# Patient Record
Sex: MALE | Race: WHITE
[De-identification: names, ages, dates, MRNs, and addresses within clinical notes are randomized per-mention and may not be internally consistent; named-entity substitution may affect disease eponyms.]

---

## 2017-01-03 ENCOUNTER — HOSPITAL ENCOUNTER (OUTPATIENT)
Dept: HOSPITAL 53 - M SFHCADAM | Age: 72
End: 2017-01-03
Attending: FAMILY MEDICINE
Payer: MEDICARE

## 2017-01-03 DIAGNOSIS — M25.50: Primary | ICD-10-CM

## 2017-01-17 ENCOUNTER — HOSPITAL ENCOUNTER (OUTPATIENT)
Dept: HOSPITAL 53 - M RAD | Age: 72
End: 2017-01-17
Attending: ORTHOPAEDIC SURGERY
Payer: MEDICARE
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
## 2017-01-17 DIAGNOSIS — S22.080A: Primary | ICD-10-CM
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
## 2017-01-17 PROCEDURE — 78306 BONE IMAGING WHOLE BODY: CPT

## 2017-01-17 NOTE — REP
Whole body radionuclide bone scan:

 

History:  Wedge compression fracture T11-12 vertebra.  Chronic back pain.  No

recent fall.

 

Comparison MRI study lumbar spine September 19, 2016 suggests osteoporotic

insufficiency fractures.  Thoracic spine MRI study August 25, 2016 shows old

wedging at T3 and T8.

 

Technique:  21.6 mCi technetium 99m MDP is injected and standard whole body bone

scan imaging is acquired.

 

Scintigraphic findings:  There is uptake in bilateral kidneys and in the urinary

bladder.  There is evidence of a left posterior bladder diverticulum.

 

There is a horizontal band-like area of increased uptake involving the T12

vertebral body consistent with a recent osteoporotic wedge compression deformity.

There is also a mild linear area of increased uptake in the L2 vertebral body.

Otherwise, there is normal distribution of skeletal tracer.  There is no evidence

to suggest metastatic disease.  Mild arthritic uptake is seen in each knee.

 

Impression:

 

Findings consistent with recent osteoporotic compression deformities at T12 and

L2.  No scintigraphic evidence to suggest metastatic disease.

 

 

Signed by

Gabe Morse MD 01/17/2017 02:32 P

## 2017-01-29 ENCOUNTER — HOSPITAL ENCOUNTER (OUTPATIENT)
Dept: HOSPITAL 53 - M LAB REF | Age: 72
End: 2017-01-29
Attending: INTERNAL MEDICINE
Payer: MEDICARE

## 2017-01-29 DIAGNOSIS — M80.00XA: Primary | ICD-10-CM

## 2017-02-06 ENCOUNTER — HOSPITAL ENCOUNTER (OUTPATIENT)
Dept: HOSPITAL 53 - M SFHCADAM | Age: 72
End: 2017-02-06
Attending: FAMILY MEDICINE
Payer: MEDICARE

## 2017-02-06 DIAGNOSIS — S22.000G: Primary | ICD-10-CM
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
## 2017-02-06 DIAGNOSIS — E83.52: ICD-10-CM
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
## 2017-02-06 LAB — PROT SERPL-MCNC: 7.2 GM/DL (ref 6.4–8.2)

## 2017-02-08 LAB
ALBUMIN MFR UR ELPH: 57.7 % (ref 55.8–66.1)
ALBUMIN SERPL-MCNC: 4.15 GM/DL (ref 3.29–5.55)
GAMMA GLOB 24H MFR UR ELPH: 12.8 % (ref 11.1–18.8)

## 2017-05-31 ENCOUNTER — HOSPITAL ENCOUNTER (OUTPATIENT)
Dept: HOSPITAL 53 - M LAB REF | Age: 72
End: 2017-05-31
Attending: INTERNAL MEDICINE
Payer: MEDICARE

## 2017-05-31 DIAGNOSIS — E83.59: Primary | ICD-10-CM

## 2017-06-02 ENCOUNTER — HOSPITAL ENCOUNTER (OUTPATIENT)
Dept: HOSPITAL 53 - M SFHCCLAY | Age: 72
End: 2017-06-02
Attending: FAMILY MEDICINE
Payer: MEDICARE

## 2017-06-02 DIAGNOSIS — E78.2: Primary | ICD-10-CM

## 2017-06-02 LAB
ALBUMIN SERPL BCG-MCNC: 3.7 GM/DL (ref 3.2–5.2)
ALBUMIN/GLOB SERPL: 1.19 {RATIO} (ref 1–1.93)
ALP SERPL-CCNC: 120 U/L (ref 45–117)
ALT SERPL W P-5'-P-CCNC: 34 U/L (ref 12–78)
ANION GAP SERPL CALC-SCNC: 7 MEQ/L (ref 8–16)
AST SERPL-CCNC: 21 U/L (ref 15–37)
BILIRUB SERPL-MCNC: 0.8 MG/DL (ref 0.2–1)
BUN SERPL-MCNC: 16 MG/DL (ref 7–18)
CALCIUM SERPL-MCNC: 9.2 MG/DL (ref 8.8–10.2)
CHLORIDE SERPL-SCNC: 104 MEQ/L (ref 98–107)
CHOLEST SERPL-MCNC: 136 MG/DL (ref ?–200)
CO2 SERPL-SCNC: 29 MEQ/L (ref 21–32)
CREAT SERPL-MCNC: 0.99 MG/DL (ref 0.7–1.3)
GFR SERPL CREATININE-BSD FRML MDRD: > 60 ML/MIN/{1.73_M2} (ref 42–?)
GLUCOSE SERPL-MCNC: 107 MG/DL (ref 83–110)
POTASSIUM SERPL-SCNC: 3.4 MEQ/L (ref 3.5–5.1)
PROT SERPL-MCNC: 6.8 GM/DL (ref 6.4–8.2)
SODIUM SERPL-SCNC: 140 MEQ/L (ref 136–145)
TRIGL SERPL-MCNC: 83 MG/DL (ref ?–150)

## 2017-10-10 ENCOUNTER — HOSPITAL ENCOUNTER (OUTPATIENT)
Dept: HOSPITAL 53 - M LAB REF | Age: 72
End: 2017-10-10
Attending: INTERNAL MEDICINE
Payer: MEDICARE

## 2017-10-10 DIAGNOSIS — M80.00XA: Primary | ICD-10-CM

## 2017-10-11 ENCOUNTER — HOSPITAL ENCOUNTER (OUTPATIENT)
Dept: HOSPITAL 53 - M LABDRAWC | Age: 72
End: 2017-10-11
Attending: INTERNAL MEDICINE
Payer: MEDICARE

## 2017-10-11 DIAGNOSIS — M80.00XA: Primary | ICD-10-CM

## 2017-10-11 DIAGNOSIS — M89.78: ICD-10-CM

## 2017-10-11 LAB
ANION GAP SERPL CALC-SCNC: 5 MEQ/L (ref 8–16)
BUN SERPL-MCNC: 14 MG/DL (ref 7–18)
CALCIUM SERPL-MCNC: 9.7 MG/DL (ref 8.8–10.2)
CHLORIDE SERPL-SCNC: 103 MEQ/L (ref 98–107)
CO2 SERPL-SCNC: 33 MEQ/L (ref 21–32)
CREAT SERPL-MCNC: 1.04 MG/DL (ref 0.7–1.3)
GFR SERPL CREATININE-BSD FRML MDRD: > 60 ML/MIN/{1.73_M2} (ref 42–?)
GLUCOSE SERPL-MCNC: 102 MG/DL (ref 83–110)
POTASSIUM SERPL-SCNC: 3 MEQ/L (ref 3.5–5.1)
SODIUM SERPL-SCNC: 141 MEQ/L (ref 136–145)

## 2017-11-01 ENCOUNTER — HOSPITAL ENCOUNTER (OUTPATIENT)
Dept: HOSPITAL 53 - M SFHCLERA | Age: 72
End: 2017-11-01
Attending: DERMATOLOGY
Payer: MEDICARE

## 2017-11-01 DIAGNOSIS — D48.5: Primary | ICD-10-CM

## 2017-11-01 PROCEDURE — 88305 TISSUE EXAM BY PATHOLOGIST: CPT

## 2017-11-01 PROCEDURE — 11100: CPT

## 2017-11-14 ENCOUNTER — HOSPITAL ENCOUNTER (OUTPATIENT)
Dept: HOSPITAL 53 - M LABDRAWC | Age: 72
End: 2017-11-14
Attending: INTERNAL MEDICINE
Payer: MEDICARE

## 2017-11-14 DIAGNOSIS — E87.6: Primary | ICD-10-CM

## 2017-11-17 ENCOUNTER — HOSPITAL ENCOUNTER (OUTPATIENT)
Dept: HOSPITAL 53 - M OPP | Age: 72
Discharge: HOME | End: 2017-11-17
Attending: INTERNAL MEDICINE
Payer: MEDICARE

## 2017-11-17 VITALS — DIASTOLIC BLOOD PRESSURE: 57 MMHG | SYSTOLIC BLOOD PRESSURE: 113 MMHG

## 2017-11-17 VITALS — WEIGHT: 189 LBS | HEIGHT: 70 IN | BODY MASS INDEX: 27.06 KG/M2

## 2017-11-17 DIAGNOSIS — Z95.5: ICD-10-CM

## 2017-11-17 DIAGNOSIS — M80.00XA: ICD-10-CM

## 2017-11-17 DIAGNOSIS — K57.30: ICD-10-CM

## 2017-11-17 DIAGNOSIS — Z79.82: ICD-10-CM

## 2017-11-17 DIAGNOSIS — Z88.0: ICD-10-CM

## 2017-11-17 DIAGNOSIS — Z80.1: ICD-10-CM

## 2017-11-17 DIAGNOSIS — Z87.891: ICD-10-CM

## 2017-11-17 DIAGNOSIS — M19.90: ICD-10-CM

## 2017-11-17 DIAGNOSIS — K64.8: ICD-10-CM

## 2017-11-17 DIAGNOSIS — Z79.899: ICD-10-CM

## 2017-11-17 DIAGNOSIS — Z12.11: Primary | ICD-10-CM

## 2017-11-17 DIAGNOSIS — Z88.8: ICD-10-CM

## 2017-11-17 DIAGNOSIS — D12.5: ICD-10-CM

## 2017-11-17 DIAGNOSIS — D12.3: ICD-10-CM

## 2017-11-17 DIAGNOSIS — I25.2: ICD-10-CM

## 2017-11-17 DIAGNOSIS — Z86.010: ICD-10-CM

## 2017-11-17 DIAGNOSIS — K62.1: ICD-10-CM

## 2017-11-17 DIAGNOSIS — I25.10: ICD-10-CM

## 2017-11-17 DIAGNOSIS — M54.5: ICD-10-CM

## 2017-11-17 DIAGNOSIS — Z87.442: ICD-10-CM

## 2017-11-17 RX ADMIN — SODIUM CHLORIDE ONE MLS/HR: 9 INJECTION, SOLUTION INTRAVENOUS at 08:42

## 2017-11-17 NOTE — ROOR
________________________________________________________________________________

Patient Name: Sebastian Torres            Procedure Date: 11/17/2017 9:38 AM

MRN: [id number]                          Account Number: [id number]

YOB: 1945                Age: 72

Room: [facility]                            Gender: Male

Note Status: Finalized                 

________________________________________________________________________________

 

Procedure:           Colonoscopy

Indications:         High risk colon cancer surveillance: Personal history of 

                     colonic polyps, Surveillance: History of adenomatous 

                     polyps, inadequate prep on last exam (<3yr)

Providers:           Michael BOYD MD

Referring MD:        Yunier Mims MD

Requesting Provider: 

Medicines:           Monitored Anesthesia Care

Complications:       No immediate complications.

________________________________________________________________________________

Procedure:           Pre-Anesthesia Assessment:

                     - The heart rate, respiratory rate, oxygen saturations, 

                     blood pressure, adequacy of pulmonary ventilation, and 

                     response to care were monitored throughout the procedure.

                     The Colonoscope was introduced through the anus and 

                     advanced to the cecum, identified by appendiceal orifice 

                     and ileocecal valve. The colonoscopy was performed 

                     without difficulty. The patient tolerated the procedure 

                     well. The quality of the bowel preparation was good.

                                                                                

Findings:

     The perianal and digital rectal examinations were normal.

     Three sessile polyps were found in the rectum, sigmoid colon and splenic 

     flexure. The polyps were 3 to 5 mm in size. These polyps were removed 

     with a cold snare. Resection and retrieval were complete.

     Multiple diverticula were found in the sigmoid colon.

     Internal hemorrhoids were found during retroflexion. The hemorrhoids were 

     medium-sized. prominent hemorrhoidal vessels

     The exam was otherwise without abnormality on direct and retroflexion 

     views.

                                                                                

Impression:          - Three 3 to 5 mm polyps in the rectum, in the sigmoid 

                     colon and at the splenic flexure, removed with a cold 

                     snare. Resected and retrieved.

                     - Moderate diverticulosis in the sigmoid colon.

                     - Internal hemorrhoids.

                     - The examination was otherwise normal on direct and 

                     retroflexion views.

Recommendation:      - Repeat colonoscopy in 3 years for surveillance.

                     - Telephone endoscopist for pathology results in 2 weeks.

                                                                                

 

Michael Boyd MD

________________

Michael BOYD MD

11/17/2017 10:03:51 AM

This report has been signed electronically.

Number of Addenda: 0

 

Note Initiated On: 11/17/2017 9:38 AM

Estimated Blood Loss:

     Estimated blood loss: none.

## 2017-12-06 ENCOUNTER — HOSPITAL ENCOUNTER (OUTPATIENT)
Dept: HOSPITAL 53 - M SFHCLERA | Age: 72
End: 2017-12-06
Attending: DERMATOLOGY
Payer: MEDICARE

## 2017-12-06 DIAGNOSIS — L98.9: Primary | ICD-10-CM

## 2017-12-27 ENCOUNTER — HOSPITAL ENCOUNTER (OUTPATIENT)
Dept: HOSPITAL 53 - M SFHCCLAY | Age: 72
End: 2017-12-27
Attending: FAMILY MEDICINE
Payer: MEDICARE

## 2017-12-27 DIAGNOSIS — Z12.5: ICD-10-CM

## 2017-12-27 DIAGNOSIS — R73.03: ICD-10-CM

## 2017-12-27 DIAGNOSIS — I25.10: Primary | ICD-10-CM

## 2017-12-27 DIAGNOSIS — E78.2: ICD-10-CM

## 2017-12-27 LAB
ALBUMIN SERPL BCG-MCNC: 3.9 GM/DL (ref 3.2–5.2)
ALBUMIN/GLOB SERPL: 1.34 {RATIO} (ref 1–1.93)
ALP SERPL-CCNC: 149 U/L (ref 45–117)
ALT SERPL W P-5'-P-CCNC: 37 U/L (ref 12–78)
ANION GAP SERPL CALC-SCNC: 7 MEQ/L (ref 8–16)
AST SERPL-CCNC: 27 U/L (ref 7–37)
BILIRUB SERPL-MCNC: 0.9 MG/DL (ref 0.2–1)
BUN SERPL-MCNC: 17 MG/DL (ref 7–18)
CALCIUM SERPL-MCNC: 9 MG/DL (ref 8.8–10.2)
CHLORIDE SERPL-SCNC: 102 MEQ/L (ref 98–107)
CHOLEST SERPL-MCNC: 117 MG/DL (ref ?–200)
CO2 SERPL-SCNC: 32 MEQ/L (ref 21–32)
CREAT SERPL-MCNC: 1.01 MG/DL (ref 0.7–1.3)
ERYTHROCYTE [DISTWIDTH] IN BLOOD BY AUTOMATED COUNT: 13.5 % (ref 11.5–14.5)
EST. AVERAGE GLUCOSE BLD GHB EST-MCNC: 117 MG/DL (ref 60–110)
GFR SERPL CREATININE-BSD FRML MDRD: > 60 ML/MIN/{1.73_M2} (ref 42–?)
GLUCOSE SERPL-MCNC: 112 MG/DL (ref 83–110)
MCH RBC QN AUTO: 33.2 PG (ref 27–33)
MCHC RBC AUTO-ENTMCNC: 35.6 G/DL (ref 32–36.5)
MCV RBC AUTO: 93.2 FL (ref 80–96)
NRBC BLD AUTO-RTO: 0 % (ref 0–0)
PLATELET # BLD AUTO: 186 10^3/UL (ref 150–450)
POTASSIUM SERPL-SCNC: 2.8 MEQ/L (ref 3.5–5.1)
PROT SERPL-MCNC: 6.8 GM/DL (ref 6.4–8.2)
SODIUM SERPL-SCNC: 141 MEQ/L (ref 136–145)
TRIGL SERPL-MCNC: 67 MG/DL (ref ?–150)
WBC # BLD AUTO: 5 10^3/UL (ref 4–10)

## 2017-12-27 PROCEDURE — 80053 COMPREHEN METABOLIC PANEL: CPT

## 2017-12-27 PROCEDURE — 83036 HEMOGLOBIN GLYCOSYLATED A1C: CPT

## 2017-12-27 PROCEDURE — 85027 COMPLETE CBC AUTOMATED: CPT

## 2017-12-27 PROCEDURE — 80061 LIPID PANEL: CPT

## 2017-12-29 ENCOUNTER — HOSPITAL ENCOUNTER (OUTPATIENT)
Dept: HOSPITAL 53 - M SFHCCLAY | Age: 72
End: 2017-12-29
Attending: FAMILY MEDICINE
Payer: MEDICARE

## 2017-12-29 DIAGNOSIS — E87.6: Primary | ICD-10-CM

## 2017-12-29 LAB
ANION GAP: 5 MEQ/L (ref 8–16)
BLOOD UREA NITROGEN: 13 MG/DL (ref 7–18)
CALCIUM LEVEL: 9.7 MG/DL (ref 8.8–10.2)
CARBON DIOXIDE LEVEL: 33 MEQ/L (ref 21–32)
CHLORIDE LEVEL: 104 MEQ/L (ref 98–107)
CREATININE FOR GFR: 1.03 MG/DL (ref 0.7–1.3)
GFR SERPL CREATININE-BSD FRML MDRD: > 60 ML/MIN/{1.73_M2} (ref 42–?)
GLUCOSE, FASTING: 112 MG/DL (ref 83–110)
POTASSIUM SERUM: 3.1 MEQ/L (ref 3.5–5.1)
SODIUM LEVEL: 142 MEQ/L (ref 136–145)

## 2017-12-29 PROCEDURE — 80048 BASIC METABOLIC PNL TOTAL CA: CPT

## 2018-02-27 ENCOUNTER — HOSPITAL ENCOUNTER (OUTPATIENT)
Dept: HOSPITAL 53 - M SFHCCLAY | Age: 73
End: 2018-02-27
Attending: FAMILY MEDICINE
Payer: MEDICARE

## 2018-02-27 DIAGNOSIS — E87.6: Primary | ICD-10-CM

## 2018-02-27 LAB
ANION GAP: 8 MEQ/L (ref 8–16)
BLOOD UREA NITROGEN: 17 MG/DL (ref 7–18)
CALCIUM LEVEL: 9.1 MG/DL (ref 8.8–10.2)
CARBON DIOXIDE LEVEL: 30 MEQ/L (ref 21–32)
CHLORIDE LEVEL: 105 MEQ/L (ref 98–107)
CREATININE FOR GFR: 0.91 MG/DL (ref 0.7–1.3)
GFR SERPL CREATININE-BSD FRML MDRD: > 60 ML/MIN/{1.73_M2} (ref 42–?)
GLUCOSE, FASTING: 104 MG/DL (ref 70–100)
MAGNESIUM LEVEL: 2 MG/DL (ref 1.8–2.4)
POTASSIUM SERUM: 3 MEQ/L (ref 3.5–5.1)
SODIUM LEVEL: 143 MEQ/L (ref 136–145)

## 2018-02-27 PROCEDURE — 83735 ASSAY OF MAGNESIUM: CPT

## 2018-04-19 ENCOUNTER — HOSPITAL ENCOUNTER (OUTPATIENT)
Dept: HOSPITAL 53 - M LABDRAWC | Age: 73
End: 2018-04-19
Attending: INTERNAL MEDICINE
Payer: MEDICARE

## 2018-04-19 DIAGNOSIS — M80.00XA: Primary | ICD-10-CM

## 2018-04-19 LAB
ANION GAP: 9 MEQ/L (ref 8–16)
BLOOD UREA NITROGEN: 15 MG/DL (ref 7–18)
CALCIUM LEVEL: 10.7 MG/DL (ref 8.8–10.2)
CARBON DIOXIDE LEVEL: 29 MEQ/L (ref 21–32)
CHLORIDE LEVEL: 103 MEQ/L (ref 98–107)
CREATININE FOR GFR: 1.03 MG/DL (ref 0.7–1.3)
GFR SERPL CREATININE-BSD FRML MDRD: > 60 ML/MIN/{1.73_M2} (ref 42–?)
GLUCOSE, FASTING: 125 MG/DL (ref 70–100)
POTASSIUM SERUM: 3.5 MEQ/L (ref 3.5–5.1)
SODIUM LEVEL: 141 MEQ/L (ref 136–145)

## 2018-04-19 PROCEDURE — 80048 BASIC METABOLIC PNL TOTAL CA: CPT

## 2018-06-06 ENCOUNTER — HOSPITAL ENCOUNTER (OUTPATIENT)
Dept: HOSPITAL 53 - M SFHCLERA | Age: 73
End: 2018-06-06
Attending: DERMATOLOGY
Payer: MEDICARE

## 2018-06-06 DIAGNOSIS — L81.4: Primary | ICD-10-CM

## 2018-06-06 PROCEDURE — 88305 TISSUE EXAM BY PATHOLOGIST: CPT

## 2018-07-11 ENCOUNTER — HOSPITAL ENCOUNTER (OUTPATIENT)
Dept: HOSPITAL 53 - M SFHCCLAY | Age: 73
End: 2018-07-11
Attending: FAMILY MEDICINE
Payer: MEDICARE

## 2018-07-11 DIAGNOSIS — E87.6: ICD-10-CM

## 2018-07-11 DIAGNOSIS — R73.03: ICD-10-CM

## 2018-07-11 DIAGNOSIS — I25.10: Primary | ICD-10-CM

## 2018-07-11 LAB
ALBUMIN/GLOBULIN RATIO: 1.16 (ref 1–1.93)
ALBUMIN: 3.7 GM/DL (ref 3.2–5.2)
ALKALINE PHOSPHATASE: 115 U/L (ref 45–117)
ALT SERPL W P-5'-P-CCNC: 34 U/L (ref 12–78)
ANION GAP: 7 MEQ/L (ref 8–16)
AST SERPL-CCNC: 22 U/L (ref 7–37)
BILIRUBIN,TOTAL: 0.8 MG/DL (ref 0.2–1)
BLOOD UREA NITROGEN: 15 MG/DL (ref 7–18)
CALCIUM LEVEL: 9.4 MG/DL (ref 8.8–10.2)
CARBON DIOXIDE LEVEL: 31 MEQ/L (ref 21–32)
CHLORIDE LEVEL: 104 MEQ/L (ref 98–107)
CREATININE FOR GFR: 1.03 MG/DL (ref 0.7–1.3)
EST. AVERAGE GLUCOSE BLD GHB EST-MCNC: 114 MG/DL (ref 60–110)
GFR SERPL CREATININE-BSD FRML MDRD: > 60 ML/MIN/{1.73_M2} (ref 42–?)
GLUCOSE, FASTING: 105 MG/DL (ref 70–100)
HEMATOCRIT: 44.9 % (ref 42–52)
HEMOGLOBIN: 16 G/DL (ref 13.5–17.5)
MAGNESIUM LEVEL: 1.6 MG/DL (ref 1.8–2.4)
MEAN CORPUSCULAR HEMOGLOBIN: 34 PG (ref 27–33)
MEAN CORPUSCULAR HGB CONC: 35.6 G/DL (ref 32–36.5)
MEAN CORPUSCULAR VOLUME: 95.5 FL (ref 80–96)
NRBC BLD AUTO-RTO: 0 % (ref 0–0)
PLATELET COUNT, AUTOMATED: 157 10^3/UL (ref 150–450)
POTASSIUM SERUM: 3.2 MEQ/L (ref 3.5–5.1)
RED BLOOD COUNT: 4.7 10^6/UL (ref 4.3–6.1)
RED CELL DISTRIBUTION WIDTH: 13.1 % (ref 11.5–14.5)
SODIUM LEVEL: 142 MEQ/L (ref 136–145)
TOTAL PROTEIN: 6.9 GM/DL (ref 6.4–8.2)
WHITE BLOOD COUNT: 4.5 10^3/UL (ref 4–10)

## 2018-07-11 PROCEDURE — 83735 ASSAY OF MAGNESIUM: CPT

## 2018-07-19 ENCOUNTER — HOSPITAL ENCOUNTER (OUTPATIENT)
Dept: HOSPITAL 53 - M SFHCADAM | Age: 73
End: 2018-07-19
Attending: FAMILY MEDICINE
Payer: MEDICARE

## 2018-07-19 DIAGNOSIS — R76.8: ICD-10-CM

## 2018-07-19 DIAGNOSIS — M25.40: Primary | ICD-10-CM

## 2018-07-19 LAB
ERYTHROCYTE SEDIMENTATION RATE: 4 MM/HR (ref 0–20)
RHEUMATOID FACTOR QUANT: < 10 IU/ML (ref ?–15)

## 2018-07-19 PROCEDURE — 86609 BACTERIUM ANTIBODY: CPT

## 2018-07-26 LAB
ANA CENTROMERE PATTERN: (no result)
ANA HOMOGENEOUS PATTERN: (no result)
ANA INTERCELL BRIDGE TITR SER IF: (no result) {TITER}
ANA NUCLEAR DOTS TITR SER IF: (no result) {TITER}
ANA NUCLEAR MEMBRANE PATTERN: (no result)
ANA NUCLEOLAR PATTERN: (no result)
ANA SPECKLED PATTERN: (no result)
B BURGDOR IGG+IGM SER-ACNC: <0.91 ISR (ref 0–0.9)
B BURGDOR IGM SER IA-ACNC: <0.8 INDEX (ref 0–0.79)
B MICROTI IGG TITR SER: (no result) {TITER}
DEPRECATED CENTRIOLE AB TITR SER IF: (no result) {TITER}
ENA PCNA AB TITR SER IF: (no result) {TITER}
Lab: (no result)
MITOTIC SPINDLE APP AB TITR SERPL IF: (no result) {TITER}

## 2018-08-20 ENCOUNTER — HOSPITAL ENCOUNTER (OUTPATIENT)
Dept: HOSPITAL 53 - M LRY | Age: 73
End: 2018-08-20
Attending: INTERNAL MEDICINE
Payer: MEDICARE

## 2018-08-20 DIAGNOSIS — M17.0: ICD-10-CM

## 2018-08-20 DIAGNOSIS — S32.020D: ICD-10-CM

## 2018-08-20 DIAGNOSIS — S22.080D: Primary | ICD-10-CM

## 2018-08-20 DIAGNOSIS — M25.561: ICD-10-CM

## 2018-08-20 DIAGNOSIS — S32.040D: ICD-10-CM

## 2018-08-20 DIAGNOSIS — M54.5: ICD-10-CM

## 2018-08-20 PROCEDURE — 72100 X-RAY EXAM L-S SPINE 2/3 VWS: CPT

## 2018-08-29 ENCOUNTER — HOSPITAL ENCOUNTER (OUTPATIENT)
Dept: HOSPITAL 53 - M PT | Age: 73
LOS: 2 days | End: 2018-08-31
Attending: INTERNAL MEDICINE
Payer: MEDICARE

## 2018-08-29 DIAGNOSIS — Z51.89: Primary | ICD-10-CM

## 2018-08-29 DIAGNOSIS — M54.5: ICD-10-CM

## 2018-08-29 PROCEDURE — 97110 THERAPEUTIC EXERCISES: CPT

## 2018-09-05 ENCOUNTER — HOSPITAL ENCOUNTER (OUTPATIENT)
Dept: HOSPITAL 53 - M PT | Age: 73
LOS: 25 days | End: 2018-09-30
Attending: INTERNAL MEDICINE
Payer: MEDICARE

## 2018-09-05 DIAGNOSIS — M54.5: Primary | ICD-10-CM

## 2018-09-05 DIAGNOSIS — M25.561: ICD-10-CM

## 2018-09-05 PROCEDURE — 97110 THERAPEUTIC EXERCISES: CPT

## 2018-09-06 ENCOUNTER — HOSPITAL ENCOUNTER (OUTPATIENT)
Dept: HOSPITAL 53 - M RAD | Age: 73
End: 2018-09-06
Attending: FAMILY MEDICINE
Payer: MEDICARE

## 2018-09-06 DIAGNOSIS — I71.4: Primary | ICD-10-CM

## 2018-09-06 PROCEDURE — 76775 US EXAM ABDO BACK WALL LIM: CPT

## 2018-09-14 ENCOUNTER — HOSPITAL ENCOUNTER (OUTPATIENT)
Dept: HOSPITAL 53 - M SFHCCLAY | Age: 73
End: 2018-09-14
Attending: FAMILY MEDICINE
Payer: MEDICARE

## 2018-09-14 DIAGNOSIS — M54.5: Primary | ICD-10-CM

## 2018-09-14 DIAGNOSIS — E87.6: ICD-10-CM

## 2018-09-14 LAB
25(OH)D3 SERPL-MCNC: 37.9 NG/ML (ref 30–100)
ANION GAP: 8 MEQ/L (ref 8–16)
BASO #: 0 10^3/UL (ref 0–0.2)
BASO %: 0.9 % (ref 0–1)
BLOOD UREA NITROGEN: 14 MG/DL (ref 7–18)
CALCIUM LEVEL: 9.7 MG/DL (ref 8.8–10.2)
CARBON DIOXIDE LEVEL: 27 MEQ/L (ref 21–32)
CHLORIDE LEVEL: 106 MEQ/L (ref 98–107)
CREATININE FOR GFR: 1.07 MG/DL (ref 0.7–1.3)
EOS #: 0.2 10^3/UL (ref 0–0.5)
EOSINOPHIL NFR BLD AUTO: 4.6 % (ref 0–3)
GFR SERPL CREATININE-BSD FRML MDRD: > 60 ML/MIN/{1.73_M2} (ref 42–?)
GLUCOSE, FASTING: 104 MG/DL (ref 70–100)
HEMATOCRIT: 46.7 % (ref 42–52)
HEMOGLOBIN: 16 G/DL (ref 13.5–17.5)
IMMATURE GRANULOCYTE %: 0.2 % (ref 0–3)
LYMPH #: 1.3 10^3/UL (ref 1.5–4.5)
LYMPH %: 28.4 % (ref 24–44)
MAGNESIUM LEVEL: 2 MG/DL (ref 1.8–2.4)
MEAN CORPUSCULAR HEMOGLOBIN: 34.2 PG (ref 27–33)
MEAN CORPUSCULAR HGB CONC: 34.3 G/DL (ref 32–36.5)
MEAN CORPUSCULAR VOLUME: 99.8 FL (ref 80–96)
MONO #: 0.6 10^3/UL (ref 0–0.8)
MONO %: 13 % (ref 0–5)
NEUTROPHILS #: 2.4 10^3/UL (ref 1.8–7.7)
NEUTROPHILS %: 52.9 % (ref 36–66)
NRBC BLD AUTO-RTO: 0 % (ref 0–0)
PLATELET COUNT, AUTOMATED: 149 10^3/UL (ref 150–450)
POTASSIUM SERUM: 3.8 MEQ/L (ref 3.5–5.1)
RED BLOOD COUNT: 4.68 10^6/UL (ref 4.3–6.1)
RED CELL DISTRIBUTION WIDTH: 13.2 % (ref 11.5–14.5)
SODIUM LEVEL: 141 MEQ/L (ref 136–145)
THYROID STIMULATING HORMONE: 2.35 UIU/ML (ref 0.36–3.74)
VITAMIN B12 LEVEL: 501 PG/ML (ref 247–911)
WHITE BLOOD COUNT: 4.5 10^3/UL (ref 4–10)

## 2018-09-14 PROCEDURE — 83735 ASSAY OF MAGNESIUM: CPT

## 2018-10-02 ENCOUNTER — HOSPITAL ENCOUNTER (OUTPATIENT)
Dept: HOSPITAL 53 - M LABDRAW1 | Age: 73
End: 2018-10-02
Attending: INTERNAL MEDICINE
Payer: MEDICARE

## 2018-10-02 ENCOUNTER — HOSPITAL ENCOUNTER (OUTPATIENT)
Dept: HOSPITAL 53 - M PT | Age: 73
LOS: 29 days | End: 2018-10-31
Attending: INTERNAL MEDICINE
Payer: MEDICARE

## 2018-10-02 DIAGNOSIS — M54.5: ICD-10-CM

## 2018-10-02 DIAGNOSIS — M25.561: ICD-10-CM

## 2018-10-02 DIAGNOSIS — M80.00XA: Primary | ICD-10-CM

## 2018-10-02 DIAGNOSIS — Z51.89: Primary | ICD-10-CM

## 2018-10-02 LAB
ANION GAP: 7 MEQ/L (ref 8–16)
BLOOD UREA NITROGEN: 14 MG/DL (ref 7–18)
CALCIUM LEVEL: 9.3 MG/DL (ref 8.8–10.2)
CARBON DIOXIDE LEVEL: 26 MEQ/L (ref 21–32)
CHLORIDE LEVEL: 107 MEQ/L (ref 98–107)
CREATININE FOR GFR: 1.14 MG/DL (ref 0.7–1.3)
GFR SERPL CREATININE-BSD FRML MDRD: > 60 ML/MIN/{1.73_M2} (ref 42–?)
GLUCOSE, FASTING: 86 MG/DL (ref 70–100)
POTASSIUM SERUM: 4 MEQ/L (ref 3.5–5.1)
SODIUM LEVEL: 140 MEQ/L (ref 136–145)

## 2018-10-02 PROCEDURE — 97110 THERAPEUTIC EXERCISES: CPT

## 2018-10-02 PROCEDURE — 80048 BASIC METABOLIC PNL TOTAL CA: CPT

## 2018-10-04 ENCOUNTER — HOSPITAL ENCOUNTER (OUTPATIENT)
Dept: HOSPITAL 53 - M LAB REF | Age: 73
End: 2018-10-04
Attending: INTERNAL MEDICINE
Payer: MEDICARE

## 2018-10-04 DIAGNOSIS — M80.00XA: Primary | ICD-10-CM

## 2018-10-04 LAB — CALCIUM, 24 HOUR URINE: 330 MG/24HR (ref 42–353)

## 2018-10-04 PROCEDURE — 82340 ASSAY OF CALCIUM IN URINE: CPT

## 2018-10-05 ENCOUNTER — HOSPITAL ENCOUNTER (OUTPATIENT)
Dept: HOSPITAL 53 - M PLARAD | Age: 73
End: 2018-10-05
Attending: INTERNAL MEDICINE
Payer: MEDICARE

## 2018-10-05 DIAGNOSIS — M54.5: Primary | ICD-10-CM

## 2018-12-11 ENCOUNTER — HOSPITAL ENCOUNTER (OUTPATIENT)
Dept: HOSPITAL 53 - M SFHCADAM | Age: 73
End: 2018-12-11
Attending: FAMILY MEDICINE
Payer: MEDICARE

## 2018-12-11 DIAGNOSIS — E78.2: Primary | ICD-10-CM

## 2018-12-11 DIAGNOSIS — R73.03: ICD-10-CM

## 2018-12-11 DIAGNOSIS — I25.10: ICD-10-CM

## 2018-12-11 LAB
ALBUMIN SERPL BCG-MCNC: 4.2 GM/DL (ref 3.2–5.2)
ALT SERPL W P-5'-P-CCNC: 43 U/L (ref 12–78)
BILIRUB SERPL-MCNC: 0.9 MG/DL (ref 0.2–1)
BUN SERPL-MCNC: 16 MG/DL (ref 7–18)
CALCIUM SERPL-MCNC: 9.6 MG/DL (ref 8.8–10.2)
CHLORIDE SERPL-SCNC: 107 MEQ/L (ref 98–107)
CHOLEST SERPL-MCNC: 128 MG/DL (ref ?–200)
CHOLEST/HDLC SERPL: 2.37 {RATIO} (ref ?–5)
CO2 SERPL-SCNC: 31 MEQ/L (ref 21–32)
CREAT SERPL-MCNC: 1.12 MG/DL (ref 0.7–1.3)
EST. AVERAGE GLUCOSE BLD GHB EST-MCNC: 123 MG/DL (ref 60–110)
GFR SERPL CREATININE-BSD FRML MDRD: > 60 ML/MIN/{1.73_M2} (ref 42–?)
GLUCOSE SERPL-MCNC: 98 MG/DL (ref 70–100)
HCT VFR BLD AUTO: 48 % (ref 42–52)
HDLC SERPL-MCNC: 54 MG/DL (ref 40–?)
HGB BLD-MCNC: 16.4 G/DL (ref 13.5–17.5)
LDLC SERPL CALC-MCNC: 57 MG/DL (ref ?–100)
MAGNESIUM SERPL-MCNC: 2.2 MG/DL (ref 1.8–2.4)
MCH RBC QN AUTO: 33.7 PG (ref 27–33)
MCHC RBC AUTO-ENTMCNC: 34.2 G/DL (ref 32–36.5)
MCV RBC AUTO: 98.6 FL (ref 80–96)
NONHDLC SERPL-MCNC: 74 MG/DL
PLATELET # BLD AUTO: 159 10^3/UL (ref 150–450)
POTASSIUM SERPL-SCNC: 3.7 MEQ/L (ref 3.5–5.1)
PROT SERPL-MCNC: 7.4 GM/DL (ref 6.4–8.2)
RBC # BLD AUTO: 4.87 10^6/UL (ref 4.3–6.1)
SODIUM SERPL-SCNC: 143 MEQ/L (ref 136–145)
T4 FREE SERPL-MCNC: 1.05 NG/DL (ref 0.76–1.46)
TRIGL SERPL-MCNC: 86 MG/DL (ref ?–150)
TSH SERPL DL<=0.005 MIU/L-ACNC: 2.33 UIU/ML (ref 0.36–3.74)
WBC # BLD AUTO: 5.5 10^3/UL (ref 4–10)

## 2018-12-11 PROCEDURE — 84443 ASSAY THYROID STIM HORMONE: CPT

## 2018-12-11 PROCEDURE — 83036 HEMOGLOBIN GLYCOSYLATED A1C: CPT

## 2018-12-11 PROCEDURE — 80061 LIPID PANEL: CPT

## 2018-12-11 PROCEDURE — 80053 COMPREHEN METABOLIC PANEL: CPT

## 2018-12-11 PROCEDURE — 83735 ASSAY OF MAGNESIUM: CPT

## 2018-12-11 PROCEDURE — 84439 ASSAY OF FREE THYROXINE: CPT

## 2018-12-11 PROCEDURE — 85027 COMPLETE CBC AUTOMATED: CPT

## 2019-04-04 ENCOUNTER — HOSPITAL ENCOUNTER (OUTPATIENT)
Dept: HOSPITAL 53 - M WHC | Age: 74
End: 2019-04-04
Attending: NURSE PRACTITIONER
Payer: MEDICARE

## 2019-04-04 DIAGNOSIS — M85.88: ICD-10-CM

## 2019-04-04 DIAGNOSIS — M85.852: ICD-10-CM

## 2019-04-04 DIAGNOSIS — M85.851: ICD-10-CM

## 2019-04-04 DIAGNOSIS — M80.00XA: Primary | ICD-10-CM

## 2019-04-08 NOTE — DEXA
AP SPINE   L1 - L4   0.990   -1.7      -1.4

LT FEMUR   TOTAL   0.859   -1.2      -0.9

LT NECK      0.767   -2.0      -1.0

RT FEMUR   TOTAL   0.849   -1.3      -0.9      

RT NECK      0.785   -1.8      -0.9

TOTAL BODY   TOTAL

OTHER



COMMENTS:

There is low bone density of the spine and hips.

The density of the spine has increased 9.4% since 12/01/2016.

The density of the left hip has decreased 2.6% since 12/01/2016.

The density of the right hip has decreased 1.8% since 12/01/2016.

The increased density of the spine does represent a significant change.

The decreased density of the left hip does represent a significant change.

The decreased density of the right hip does not represent a significant change.



FOLLOW-UP:

Recommendation for the next bone density exam: 2 years.



MAILE

## 2019-08-26 ENCOUNTER — HOSPITAL ENCOUNTER (OUTPATIENT)
Dept: HOSPITAL 53 - M LABDRAWC | Age: 74
End: 2019-08-26
Attending: INTERNAL MEDICINE
Payer: MEDICARE

## 2019-08-26 DIAGNOSIS — M80.00XA: Primary | ICD-10-CM

## 2019-08-26 LAB
25(OH)D3 SERPL-MCNC: 41.7 NG/ML (ref 30–100)
BUN SERPL-MCNC: 14 MG/DL (ref 7–18)
CALCIUM SERPL-MCNC: 9 MG/DL (ref 8.8–10.2)
CHLORIDE SERPL-SCNC: 107 MEQ/L (ref 98–107)
CO2 SERPL-SCNC: 30 MEQ/L (ref 21–32)
CREAT SERPL-MCNC: 1.08 MG/DL (ref 0.7–1.3)
GFR SERPL CREATININE-BSD FRML MDRD: > 60 ML/MIN/{1.73_M2} (ref 42–?)
GLUCOSE SERPL-MCNC: 108 MG/DL (ref 70–100)
POTASSIUM SERPL-SCNC: 3.6 MEQ/L (ref 3.5–5.1)
SODIUM SERPL-SCNC: 141 MEQ/L (ref 136–145)

## 2019-10-31 ENCOUNTER — HOSPITAL ENCOUNTER (OUTPATIENT)
Dept: HOSPITAL 53 - M SFHCCLAY | Age: 74
End: 2019-10-31
Attending: FAMILY MEDICINE
Payer: MEDICARE

## 2019-10-31 DIAGNOSIS — E78.2: ICD-10-CM

## 2019-10-31 DIAGNOSIS — Z12.5: ICD-10-CM

## 2019-10-31 DIAGNOSIS — I25.10: Primary | ICD-10-CM

## 2019-10-31 DIAGNOSIS — Z98.61: ICD-10-CM

## 2019-10-31 DIAGNOSIS — R73.03: ICD-10-CM

## 2019-10-31 LAB
ALBUMIN SERPL BCG-MCNC: 4 GM/DL (ref 3.2–5.2)
ALT SERPL W P-5'-P-CCNC: 31 U/L (ref 12–78)
BILIRUB SERPL-MCNC: 1.3 MG/DL (ref 0.2–1)
BUN SERPL-MCNC: 14 MG/DL (ref 7–18)
CALCIUM SERPL-MCNC: 9.6 MG/DL (ref 8.8–10.2)
CHLORIDE SERPL-SCNC: 106 MEQ/L (ref 98–107)
CHOLEST SERPL-MCNC: 133 MG/DL (ref ?–200)
CHOLEST/HDLC SERPL: 2.08 {RATIO} (ref ?–5)
CO2 SERPL-SCNC: 31 MEQ/L (ref 21–32)
CREAT SERPL-MCNC: 1.16 MG/DL (ref 0.7–1.3)
EST. AVERAGE GLUCOSE BLD GHB EST-MCNC: 120 MG/DL (ref 60–110)
GFR SERPL CREATININE-BSD FRML MDRD: > 60 ML/MIN/{1.73_M2} (ref 42–?)
GLUCOSE SERPL-MCNC: 110 MG/DL (ref 70–100)
HCT VFR BLD AUTO: 52.5 % (ref 42–52)
HDLC SERPL-MCNC: 64 MG/DL (ref 40–?)
HGB BLD-MCNC: 17.6 G/DL (ref 13.5–17.5)
LDLC SERPL CALC-MCNC: 50 MG/DL (ref ?–100)
MCH RBC QN AUTO: 33.5 PG (ref 27–33)
MCHC RBC AUTO-ENTMCNC: 33.5 G/DL (ref 32–36.5)
MCV RBC AUTO: 99.8 FL (ref 80–96)
NONHDLC SERPL-MCNC: 69 MG/DL
PLATELET # BLD AUTO: 152 10^3/UL (ref 150–450)
POTASSIUM SERPL-SCNC: 3.8 MEQ/L (ref 3.5–5.1)
PROT SERPL-MCNC: 7.3 GM/DL (ref 6.4–8.2)
RBC # BLD AUTO: 5.26 10^6/UL (ref 4.3–6.1)
SODIUM SERPL-SCNC: 141 MEQ/L (ref 136–145)
TRIGL SERPL-MCNC: 93 MG/DL (ref ?–150)
WBC # BLD AUTO: 5 10^3/UL (ref 4–10)

## 2019-10-31 PROCEDURE — 85027 COMPLETE CBC AUTOMATED: CPT

## 2019-10-31 PROCEDURE — 80061 LIPID PANEL: CPT

## 2019-10-31 PROCEDURE — 80053 COMPREHEN METABOLIC PANEL: CPT

## 2019-10-31 PROCEDURE — 83036 HEMOGLOBIN GLYCOSYLATED A1C: CPT

## 2020-01-06 ENCOUNTER — HOSPITAL ENCOUNTER (EMERGENCY)
Dept: HOSPITAL 53 - M ED | Age: 75
Discharge: HOME | End: 2020-01-06
Payer: MEDICARE

## 2020-01-06 VITALS — WEIGHT: 192.4 LBS | HEIGHT: 70 IN | BODY MASS INDEX: 27.54 KG/M2

## 2020-01-06 VITALS — DIASTOLIC BLOOD PRESSURE: 68 MMHG | SYSTOLIC BLOOD PRESSURE: 118 MMHG

## 2020-01-06 DIAGNOSIS — Z79.82: ICD-10-CM

## 2020-01-06 DIAGNOSIS — Z87.81: ICD-10-CM

## 2020-01-06 DIAGNOSIS — Z79.899: ICD-10-CM

## 2020-01-06 DIAGNOSIS — N20.1: Primary | ICD-10-CM

## 2020-01-06 DIAGNOSIS — Z88.8: ICD-10-CM

## 2020-01-06 DIAGNOSIS — K80.20: ICD-10-CM

## 2020-01-06 DIAGNOSIS — K76.9: ICD-10-CM

## 2020-01-06 DIAGNOSIS — Z88.0: ICD-10-CM

## 2020-01-06 DIAGNOSIS — Z87.442: ICD-10-CM

## 2020-01-06 DIAGNOSIS — K57.30: ICD-10-CM

## 2020-01-06 DIAGNOSIS — I25.10: ICD-10-CM

## 2020-01-06 LAB
ALBUMIN SERPL BCG-MCNC: 4.2 GM/DL (ref 3.2–5.2)
ALT SERPL W P-5'-P-CCNC: 30 U/L (ref 12–78)
APTT BLD: 27.3 SECONDS (ref 25–38.4)
BASOPHILS # BLD AUTO: 0 10^3/UL (ref 0–0.2)
BASOPHILS NFR BLD AUTO: 0.6 % (ref 0–1)
BILIRUB CONJ SERPL-MCNC: 0.3 MG/DL (ref 0–0.2)
BILIRUB SERPL-MCNC: 0.9 MG/DL (ref 0.2–1)
CK MB CFR.DF SERPL CALC: 2.3
CK MB SERPL-MCNC: 1.7 NG/ML (ref ?–3.6)
CK SERPL-CCNC: 74 U/L (ref 39–308)
EOSINOPHIL # BLD AUTO: 0.1 10^3/UL (ref 0–0.5)
EOSINOPHIL NFR BLD AUTO: 1 % (ref 0–3)
HCT VFR BLD AUTO: 53.3 % (ref 42–52)
HGB BLD-MCNC: 18.1 G/DL (ref 13.5–17.5)
INR PPP: 1
LIPASE SERPL-CCNC: 227 U/L (ref 73–393)
LYMPHOCYTES # BLD AUTO: 0.7 10^3/UL (ref 1.5–5)
LYMPHOCYTES NFR BLD AUTO: 9.8 % (ref 24–44)
MCH RBC QN AUTO: 33.1 PG (ref 27–33)
MCHC RBC AUTO-ENTMCNC: 34 G/DL (ref 32–36.5)
MCV RBC AUTO: 97.4 FL (ref 80–96)
MONOCYTES # BLD AUTO: 0.7 10^3/UL (ref 0–0.8)
MONOCYTES NFR BLD AUTO: 9.3 % (ref 0–5)
NEUTROPHILS # BLD AUTO: 5.7 10^3/UL (ref 1.5–8.5)
NEUTROPHILS NFR BLD AUTO: 79 % (ref 36–66)
PLATELET # BLD AUTO: 154 10^3/UL (ref 150–450)
PROT SERPL-MCNC: 7.7 GM/DL (ref 6.4–8.2)
PROTHROMBIN TIME: 12.9 SECONDS (ref 11.8–14)
RBC # BLD AUTO: 5.47 10^6/UL (ref 4.3–6.1)
TROPONIN I SERPL-MCNC: < 0.02 NG/ML (ref ?–0.1)
WBC # BLD AUTO: 7.2 10^3/UL (ref 4–10)

## 2020-01-06 PROCEDURE — 93041 RHYTHM ECG TRACING: CPT

## 2020-01-06 PROCEDURE — 85610 PROTHROMBIN TIME: CPT

## 2020-01-06 PROCEDURE — 84484 ASSAY OF TROPONIN QUANT: CPT

## 2020-01-06 PROCEDURE — 85730 THROMBOPLASTIN TIME PARTIAL: CPT

## 2020-01-06 PROCEDURE — 80076 HEPATIC FUNCTION PANEL: CPT

## 2020-01-06 PROCEDURE — 82550 ASSAY OF CK (CPK): CPT

## 2020-01-06 PROCEDURE — 80047 BASIC METABLC PNL IONIZED CA: CPT

## 2020-01-06 PROCEDURE — 83690 ASSAY OF LIPASE: CPT

## 2020-01-06 PROCEDURE — 96375 TX/PRO/DX INJ NEW DRUG ADDON: CPT

## 2020-01-06 PROCEDURE — 85025 COMPLETE CBC W/AUTO DIFF WBC: CPT

## 2020-01-06 PROCEDURE — 82553 CREATINE MB FRACTION: CPT

## 2020-01-06 PROCEDURE — 93005 ELECTROCARDIOGRAM TRACING: CPT

## 2020-01-06 PROCEDURE — 81001 URINALYSIS AUTO W/SCOPE: CPT

## 2020-01-06 PROCEDURE — 74176 CT ABD & PELVIS W/O CONTRAST: CPT

## 2020-01-06 PROCEDURE — 96374 THER/PROPH/DIAG INJ IV PUSH: CPT

## 2020-01-06 PROCEDURE — 99284 EMERGENCY DEPT VISIT MOD MDM: CPT

## 2020-01-06 NOTE — REPVR
PROCEDURE INFORMATION: 

Exam: CT Abdomen And Pelvis Without Contrast 

Exam date and time: 1/6/2020 8:17 PM 

Age: 74 years old 

Clinical indication: Abdominal pain; Flank; Left; Additional info: Left flank 

pain, hematuria 



TECHNIQUE: 

Imaging protocol: Computed tomography of the abdomen and pelvis without 

contrast. 

Radiation optimization: All CT scans at this facility use at least one of these 

dose optimization techniques: automated exposure control; mA and/or kV 

adjustment per patient size (includes targeted exams where dose is matched to 

clinical indication); or iterative reconstruction. 



COMPARISON: 

CT ABD PELVIS W/O CONTRAST 9/26/2014 2:11 PM 



FINDINGS: 

Mediastinum: Minimal hiatal hernia. 



Liver: The liver at mid clavicular line measures 9.7 cm. Slightly nodular 

surface of the liver. Minimal recanalization of ligamentum teres. 

Gallbladder and bile ducts: There is a gallstone in the gallbladder measuring 7 

mm with question of a minimal focal calcification of the gallbladder wall. 

Pancreas: Normal. No ductal dilation. 

Spleen: The spleen measures 10.8 cm. 

Adrenals: Normal. No mass. 

Kidneys and ureters: There is a left renal cyst measuring up to 5.5 cm. 

Nonobstructing left renal calculi in the lower pole. Asymmetric left renal 

perinephric stranding with mild left hydronephrosis and hydroureter which 

extends to a mid left ureteral calculus at the mid L4 level measuring 5 x 4 x 7 

mm and appears to be bilobed or possibly 2 adjacent calculi. 

Stomach and bowel: There is colonic diverticulosis without evidence of 

diverticulitis. There is a diverticulum projecting from the proximal duodenal 

sweep. 

Appendix: A normal appendix is seen. 

Intraperitoneal space: Unremarkable. No free air. No significant fluid 

collection. 

Vasculature: Mild ectasia of the proximal infrarenal abdominal aorta measuring 

2.8 cm in diameter. There is moderate atherosclerotic calcification of the 

abdominal aorta with extension into the iliac arteries. 

Lymph nodes: Unremarkable. No enlarged lymph nodes. 



Bladder: Left lateral bladder diverticulum measuring 2.5 x 1.7 x 1.7 cm. 

Reproductive: Unremarkable as visualized. 

Bones/joints: Superior endplate depression of L2 and L4 and mild wedge 

compression of T11 and T8 and moderate central compression of T12 which appear 

to be chronic but are new since the prior study. 

Soft tissues: Unremarkable. 



IMPRESSION: 

1. Mid left renal calculus at the mid L4 level measuring 4 x 5 x 7 mm which is 

bilobed or possibly 2 adjacent calculi with secondary obstructive uropathy of 

the left upper tract. 

2. Nonobstructing left renal calculi. 

3. Colonic diverticulosis without diverticulitis. 

4. Urinary bladder diverticulum on the left measuring 2.5 x 1.7 x 1.7 cm. 

5. Cholelithiasis with question of focal gallbladder wall calcification. 

6. Suggestion of hepatic cirrhosis with borderline splenomegaly and early 

recanalization of ligamentum teres. 

7. Compression of multiple lumbar and thoracic segments which appear chronic 

but are new since 9/26/2014. 



Electronically signed by: Ramón Vasquez On 01/06/2020  21:18:34 PM

## 2020-01-07 NOTE — ED PDOC
Post-Departure Follow-Up


dr posada faxed formal report of ct abd/p for fu mlg Lundborg-Gray,Maja MD           Jan 7, 2020 13:02

## 2020-01-08 NOTE — ECGEPIP
[facility] - ED

                                       

                                       Test Date:    2020

Pat Name:     FAVIOLA SMITH            Department:   

Patient ID:   [id number]                 Room:         -

Gender:       Male                     Technician:   RUDY

:          1945               Requested By: STEPHEN VALDEZ

Order Number: [id number]     Reading MD:   Ashlie Schwab

                                 Measurements

Intervals                              Axis          

Rate:         70                       P:            12

UT:           192                      QRS:          19

QRSD:         98                       T:            49

QT:           387                                    

QTc:          419                                    

                           Interpretive Statements

SINUS RHYTHM

NSTTW abnormalities

NO PRIOR

Electronically Signed on 2020 7:47:27 EST by Ashlie Schwab

## 2020-01-24 ENCOUNTER — HOSPITAL ENCOUNTER (OUTPATIENT)
Dept: HOSPITAL 53 - M PLALAB | Age: 75
End: 2020-01-24
Attending: NURSE PRACTITIONER
Payer: MEDICARE

## 2020-01-24 DIAGNOSIS — Z01.818: Primary | ICD-10-CM

## 2020-01-24 DIAGNOSIS — N20.0: ICD-10-CM

## 2020-01-24 DIAGNOSIS — M48.54XA: ICD-10-CM

## 2020-01-24 LAB
APPEARANCE UR: CLEAR
APTT BLD: 30.2 SECONDS (ref 25–38.4)
BACTERIA UR QL AUTO: NEGATIVE
BILIRUB UR QL STRIP.AUTO: NEGATIVE
BUN SERPL-MCNC: 13 MG/DL (ref 7–18)
CALCIUM SERPL-MCNC: 9.2 MG/DL (ref 8.8–10.2)
CHLORIDE SERPL-SCNC: 108 MEQ/L (ref 98–107)
CO2 SERPL-SCNC: 28 MEQ/L (ref 21–32)
CREAT SERPL-MCNC: 1.15 MG/DL (ref 0.7–1.3)
GFR SERPL CREATININE-BSD FRML MDRD: > 60 ML/MIN/{1.73_M2} (ref 42–?)
GLUCOSE SERPL-MCNC: 98 MG/DL (ref 70–100)
GLUCOSE UR QL STRIP.AUTO: NEGATIVE MG/DL
HCT VFR BLD AUTO: 49 % (ref 42–52)
HGB BLD-MCNC: 16.8 G/DL (ref 13.5–17.5)
HGB UR QL STRIP.AUTO: NEGATIVE
INR PPP: 1.02
KETONES UR QL STRIP.AUTO: NEGATIVE MG/DL
LEUKOCYTE ESTERASE UR QL STRIP.AUTO: NEGATIVE
MCH RBC QN AUTO: 34.1 PG (ref 27–33)
MCHC RBC AUTO-ENTMCNC: 34.3 G/DL (ref 32–36.5)
MCV RBC AUTO: 99.6 FL (ref 80–96)
MUCOUS THREADS URNS QL MICRO: (no result)
NITRITE UR QL STRIP.AUTO: NEGATIVE
PH UR STRIP.AUTO: 6 UNITS (ref 5–9)
PLATELET # BLD AUTO: 130 10^3/UL (ref 150–450)
POTASSIUM SERPL-SCNC: 4 MEQ/L (ref 3.5–5.1)
PROT UR QL STRIP.AUTO: NEGATIVE MG/DL
PROTHROMBIN TIME: 13.1 SECONDS (ref 11.8–14)
RBC # BLD AUTO: 4.92 10^6/UL (ref 4.3–6.1)
RBC # UR AUTO: 2 /HPF (ref 0–3)
SODIUM SERPL-SCNC: 142 MEQ/L (ref 136–145)
SP GR UR STRIP.AUTO: 1.01 (ref 1–1.03)
SQUAMOUS #/AREA URNS AUTO: 0 /HPF (ref 0–6)
UROBILINOGEN UR QL STRIP.AUTO: 0.2 MG/DL (ref 0–2)
WBC # BLD AUTO: 4.3 10^3/UL (ref 4–10)
WBC #/AREA URNS AUTO: 1 /HPF (ref 0–3)

## 2020-01-24 NOTE — REPPI
Chest x-ray:  Two views.

 

History:  Kidney stone.  No comparison chest.

 

Findings:  There is minimal linear fibrosis in the left base involving the

lingula.  The pleural angles are sharp.  Lung fields are otherwise clear.

Cardiomediastinal silhouette is unremarkable.  There is mild anterior wedging of

several mid and lower thoracic vertebral bodies.  No other significant bony

abnormality is seen.  Pulmonary vasculature is not increased.

 

Impression:

 

There is mild wedging of three mid and lower thoracic vertebrae.  The two lower

thoracic levels, T11 and T12, are visible and unchanged compared with lumbar

spine radiographs August 20, 2018.  The mid thoracic level is not included in

that prior film.  The wedging at T8 it is however unchanged from comparison MRI

study August 25, 2016.  No active cardiopulmonary disease.

 

 

Electronically Signed by

Gabe Morse MD 01/24/2020 06:34 P

## 2020-02-05 ENCOUNTER — HOSPITAL ENCOUNTER (OUTPATIENT)
Dept: HOSPITAL 53 - M SDC | Age: 75
Discharge: HOME | End: 2020-02-05
Attending: UROLOGY
Payer: MEDICARE

## 2020-02-05 VITALS — WEIGHT: 198 LBS | HEIGHT: 68 IN | BODY MASS INDEX: 30.01 KG/M2

## 2020-02-05 VITALS — SYSTOLIC BLOOD PRESSURE: 161 MMHG | DIASTOLIC BLOOD PRESSURE: 79 MMHG

## 2020-02-05 DIAGNOSIS — Z79.899: ICD-10-CM

## 2020-02-05 DIAGNOSIS — Z98.61: ICD-10-CM

## 2020-02-05 DIAGNOSIS — E78.49: ICD-10-CM

## 2020-02-05 DIAGNOSIS — Z88.0: ICD-10-CM

## 2020-02-05 DIAGNOSIS — N20.0: Primary | ICD-10-CM

## 2020-02-05 DIAGNOSIS — M81.0: ICD-10-CM

## 2020-02-05 DIAGNOSIS — Z88.8: ICD-10-CM

## 2020-02-05 DIAGNOSIS — Z79.82: ICD-10-CM

## 2020-02-05 DIAGNOSIS — I25.10: ICD-10-CM

## 2020-02-05 DIAGNOSIS — I25.2: ICD-10-CM

## 2020-02-05 DIAGNOSIS — N20.1: ICD-10-CM

## 2020-02-05 PROCEDURE — 82365 CALCULUS SPECTROSCOPY: CPT

## 2020-02-05 PROCEDURE — 52356 CYSTO/URETERO W/LITHOTRIPSY: CPT

## 2020-02-05 PROCEDURE — 88300 SURGICAL PATH GROSS: CPT

## 2020-02-05 PROCEDURE — 74420 UROGRAPHY RTRGR +-KUB: CPT

## 2020-02-05 RX ADMIN — FENTANYL CITRATE PRN MCG: 50 INJECTION, SOLUTION INTRAMUSCULAR; INTRAVENOUS at 11:43

## 2020-02-05 RX ADMIN — FENTANYL CITRATE PRN MCG: 50 INJECTION, SOLUTION INTRAMUSCULAR; INTRAVENOUS at 11:55

## 2020-02-05 RX ADMIN — FENTANYL CITRATE PRN MCG: 50 INJECTION, SOLUTION INTRAMUSCULAR; INTRAVENOUS at 11:38

## 2020-02-05 RX ADMIN — FENTANYL CITRATE PRN MCG: 50 INJECTION, SOLUTION INTRAMUSCULAR; INTRAVENOUS at 12:00

## 2020-02-05 NOTE — REP
C-ARM VIEWS DURING PLACEMENT OF LEFT URETERAL STENT:

 

Two C-arm views are performed.  Contrast partially opacifies a mildly dilated

left pelvicalyceal system.  Left ureteral stent is placed with the proximal end

coiled in the left renal pelvis and the distal end in the urinary bladder.  17

seconds of fluoroscopy time utilized.

 

 

Electronically Signed by

Juan Lerma MD 02/05/2020 05:05 P

## 2020-02-05 NOTE — RO
DATE OF PROCEDURE:  02/05/2020

 

PREPROCEDURE DIAGNOSIS:  Left kidney and ureteral stones.

 

POSTPROCEDURE DIAGNOSIS:  Left kidney and ureteral stones.

 

PROCEDURE:  Cystoscopy, left ureteroscopy with laser lithotripsy and basket

extraction of stones, left retrograde pyelogram with intraoperative

interpretation of images, left ureteral stent placement.

 

SURGEON:  Lorenzo Melo MD

 

ASSISTANT:  None.

 

ANESTHESIA:  General.

 

OPERATIVE INDICATIONS:  This is a 75-year-old male who was found to have an

obstructing, approximately 5-6 mm proximal left ureteral stone as well as an

approximately 7 mm stone inside of his left kidney.  He was brought to the

operating room today for treatment.

 

DESCRIPTION OF PROCEDURE:  The patient was brought to the operating room and

general anesthesia was induced.  Prophylactic antibiotics were infused.  He was

then placed in the dorsal lithotomy position and prepped and draped in the usual

sterile fashion.

 

A rigid cystoscope was inserted into the urethral meatus and advanced to the

bladder.  A Guidewire was advanced up the left collecting system.  I then

advanced the ureteral access sheath up the wire.  I went the access sheath with a

flexible ureteroscope and at the level of the ureteropelvic junction an

approximately 7 mm stone was seen.  This stone was fragmented into smaller pieces

using a 200 micron laser fiber and then the fragments were removed.  The left

kidney was then thoroughly examined and no additional stones were seen.  I then

withdrew the ureteral access sheath and as I was withdrawing the ureteral access

sheath, it was found that the proximal ureteral stone had actually migrated to

the midureter and was behind the access sheath.  This stone was then removed

using a basket.

 

I then shot a retrograde pyelogram and notable for mild left hydronephrosis with

no extravasation.  I then continued removing the access sheath as well as the

ureteroscope and no additional stones were seen within the ureter.  I then

utilized the previously placed wire to advance a 6 [place] by 22-32 cm JJ ureteral

stent into the left collecting system.  The wire was removed and there were

adequate curls of the stent in the left renal pelvis and in the bladder.  The

bladder was emptied of all fluids and this marked the conclusion of the

procedure.

 

The patient was taken out of the dorsal lithotomy position, awakened from

anesthesia and transported to the recovery room in stable condition.

 

ESTIMATED BLOOD LOSS:  5 mL.

 

COMPLICATIONS:  None.

 

SPECIMENS:  Kidney stone fragments.

 

PLAN:

The patient will followup in the clinic in a week or two for stent removal.

## 2020-03-13 ENCOUNTER — HOSPITAL ENCOUNTER (OUTPATIENT)
Dept: HOSPITAL 53 - M RAD | Age: 75
End: 2020-03-13
Attending: NURSE PRACTITIONER
Payer: MEDICARE

## 2020-03-13 DIAGNOSIS — N32.3: ICD-10-CM

## 2020-03-13 DIAGNOSIS — N20.0: Primary | ICD-10-CM

## 2020-03-13 DIAGNOSIS — N28.1: ICD-10-CM

## 2020-03-13 NOTE — REP
HISTORY: History of renal calculi.

 

COMPARISON: None.

 

The right kidney measures 12.2 x 4.8 x 6 cm. The cortical echoes are within

normal limits. Cortical medullary differentiation is preserved. There are no

cystic or solid masses. There is no hydronephrosis. The left kidney measures 13.4

x 5.4 x 5.6 cm. The renal cortical echoes are within normal limits. Cortical

medullary differentiation is preserved. Seen arising from the superior pole of

the left kidney there is a 4.5 x 3.4 x 4.9 cm sized anechoic structure which

exhibits posterior wall enhancement and increased through transmission consistent

with a cyst. There are no solid masses.

 

Imaging of the urinary bladder was obtained for assessment of uro-jet

phenomenon.

 

This examination shows a Hutch diverticulum arising from the left urinary bladder

wall measuring approximately 2.5 cm.

 

IMPRESSION:

 

Left renal cyst and other findings as described above.

 

 

Electronically Signed by

Nas Schuster DO 03/13/2020 03:44 P

## 2020-06-04 ENCOUNTER — HOSPITAL ENCOUNTER (OUTPATIENT)
Dept: HOSPITAL 53 - M SFHCADAM | Age: 75
End: 2020-06-04
Attending: FAMILY MEDICINE
Payer: MEDICARE

## 2020-06-04 DIAGNOSIS — Z98.61: ICD-10-CM

## 2020-06-04 DIAGNOSIS — I25.10: Primary | ICD-10-CM

## 2020-06-04 DIAGNOSIS — E78.2: ICD-10-CM

## 2020-06-04 DIAGNOSIS — M81.0: ICD-10-CM

## 2020-06-04 DIAGNOSIS — R73.03: ICD-10-CM

## 2020-06-04 DIAGNOSIS — Z12.5: ICD-10-CM

## 2020-06-04 LAB
25(OH)D3 SERPL-MCNC: 50.6 NG/ML (ref 30–100)
ALBUMIN SERPL BCG-MCNC: 3.5 GM/DL (ref 3.2–5.2)
ALT SERPL W P-5'-P-CCNC: 34 U/L (ref 12–78)
BILIRUB SERPL-MCNC: 0.9 MG/DL (ref 0.2–1)
BUN SERPL-MCNC: 13 MG/DL (ref 7–18)
CALCIUM SERPL-MCNC: 9.1 MG/DL (ref 8.8–10.2)
CHLORIDE SERPL-SCNC: 110 MEQ/L (ref 98–107)
CHOLEST SERPL-MCNC: 120 MG/DL (ref ?–200)
CHOLEST/HDLC SERPL: 2.1 {RATIO} (ref ?–5)
CO2 SERPL-SCNC: 27 MEQ/L (ref 21–32)
CREAT SERPL-MCNC: 1 MG/DL (ref 0.7–1.3)
EST. AVERAGE GLUCOSE BLD GHB EST-MCNC: 120 MG/DL (ref 60–110)
GFR SERPL CREATININE-BSD FRML MDRD: > 60 ML/MIN/{1.73_M2} (ref 42–?)
GLUCOSE SERPL-MCNC: 100 MG/DL (ref 70–100)
HCT VFR BLD AUTO: 45.1 % (ref 42–52)
HDLC SERPL-MCNC: 57 MG/DL (ref 40–?)
HGB BLD-MCNC: 15.3 G/DL (ref 13.5–17.5)
LDLC SERPL CALC-MCNC: 49 MG/DL (ref ?–100)
MCH RBC QN AUTO: 34.2 PG (ref 27–33)
MCHC RBC AUTO-ENTMCNC: 33.9 G/DL (ref 32–36.5)
MCV RBC AUTO: 100.9 FL (ref 80–96)
NONHDLC SERPL-MCNC: 63 MG/DL
PLATELET # BLD AUTO: 133 10^3/UL (ref 150–450)
POTASSIUM SERPL-SCNC: 4.2 MEQ/L (ref 3.5–5.1)
PROT SERPL-MCNC: 6.7 GM/DL (ref 6.4–8.2)
RBC # BLD AUTO: 4.47 10^6/UL (ref 4.3–6.1)
SODIUM SERPL-SCNC: 142 MEQ/L (ref 136–145)
TRIGL SERPL-MCNC: 70 MG/DL (ref ?–150)
WBC # BLD AUTO: 4.8 10^3/UL (ref 4–10)

## 2020-06-04 PROCEDURE — 80061 LIPID PANEL: CPT

## 2020-06-04 PROCEDURE — 82306 VITAMIN D 25 HYDROXY: CPT

## 2020-06-04 PROCEDURE — 80053 COMPREHEN METABOLIC PANEL: CPT

## 2020-06-04 PROCEDURE — 83036 HEMOGLOBIN GLYCOSYLATED A1C: CPT

## 2020-06-04 PROCEDURE — 85027 COMPLETE CBC AUTOMATED: CPT

## 2020-06-09 ENCOUNTER — HOSPITAL ENCOUNTER (OUTPATIENT)
Dept: HOSPITAL 53 - M INFU | Age: 75
Discharge: HOME | End: 2020-06-09
Attending: INTERNAL MEDICINE
Payer: MEDICARE

## 2020-06-09 VITALS — SYSTOLIC BLOOD PRESSURE: 155 MMHG | DIASTOLIC BLOOD PRESSURE: 67 MMHG

## 2020-06-09 VITALS — SYSTOLIC BLOOD PRESSURE: 148 MMHG | DIASTOLIC BLOOD PRESSURE: 65 MMHG

## 2020-06-09 VITALS — HEIGHT: 70 IN | BODY MASS INDEX: 22.33 KG/M2 | WEIGHT: 156 LBS

## 2020-06-09 DIAGNOSIS — Z88.8: ICD-10-CM

## 2020-06-09 DIAGNOSIS — M81.0: Primary | ICD-10-CM

## 2020-06-09 DIAGNOSIS — Z88.0: ICD-10-CM

## 2020-06-09 PROCEDURE — 96365 THER/PROPH/DIAG IV INF INIT: CPT

## 2020-06-22 ENCOUNTER — HOSPITAL ENCOUNTER (OUTPATIENT)
Dept: HOSPITAL 53 - M SFHCADAM | Age: 75
End: 2020-06-22
Attending: FAMILY MEDICINE
Payer: MEDICARE

## 2020-06-22 ENCOUNTER — HOSPITAL ENCOUNTER (OUTPATIENT)
Dept: HOSPITAL 53 - M ADAMS | Age: 75
End: 2020-06-22
Attending: FAMILY MEDICINE
Payer: MEDICARE

## 2020-06-22 ENCOUNTER — HOSPITAL ENCOUNTER (INPATIENT)
Dept: HOSPITAL 53 - M ED | Age: 75
LOS: 5 days | Discharge: HOME | DRG: 195 | End: 2020-06-27
Attending: INTERNAL MEDICINE | Admitting: INTERNAL MEDICINE
Payer: MEDICARE

## 2020-06-22 VITALS — BODY MASS INDEX: 28.5 KG/M2 | WEIGHT: 192.4 LBS | HEIGHT: 69 IN

## 2020-06-22 DIAGNOSIS — E87.6: ICD-10-CM

## 2020-06-22 DIAGNOSIS — I25.10: ICD-10-CM

## 2020-06-22 DIAGNOSIS — E78.5: ICD-10-CM

## 2020-06-22 DIAGNOSIS — Z95.2: ICD-10-CM

## 2020-06-22 DIAGNOSIS — I25.2: ICD-10-CM

## 2020-06-22 DIAGNOSIS — Z87.891: ICD-10-CM

## 2020-06-22 DIAGNOSIS — Z79.899: ICD-10-CM

## 2020-06-22 DIAGNOSIS — R91.8: Primary | ICD-10-CM

## 2020-06-22 DIAGNOSIS — Z88.8: ICD-10-CM

## 2020-06-22 DIAGNOSIS — R50.9: Primary | ICD-10-CM

## 2020-06-22 DIAGNOSIS — Z79.82: ICD-10-CM

## 2020-06-22 DIAGNOSIS — R19.7: ICD-10-CM

## 2020-06-22 DIAGNOSIS — J18.9: Primary | ICD-10-CM

## 2020-06-22 DIAGNOSIS — R50.9: ICD-10-CM

## 2020-06-22 DIAGNOSIS — Z88.0: ICD-10-CM

## 2020-06-22 LAB
ALBUMIN SERPL BCG-MCNC: 3.3 GM/DL (ref 3.2–5.2)
ALT SERPL W P-5'-P-CCNC: 29 U/L (ref 12–78)
APPEARANCE UR: (no result)
BACTERIA URNS QL MICRO: (no result)
BASOPHILS # BLD AUTO: 0 10^3/UL (ref 0–0.2)
BASOPHILS # BLD AUTO: 0 10^3/UL (ref 0–0.2)
BASOPHILS NFR BLD AUTO: 0.1 % (ref 0–1)
BASOPHILS NFR BLD AUTO: 0.2 % (ref 0–1)
BILIRUB CONJ SERPL-MCNC: 1.1 MG/DL (ref 0–0.2)
BILIRUB SERPL-MCNC: 2.2 MG/DL (ref 0.2–1)
BILIRUB UR QL STRIP: NEGATIVE
BUN SERPL-MCNC: 21 MG/DL (ref 7–18)
CALCIUM SERPL-MCNC: 8.6 MG/DL (ref 8.8–10.2)
CHLORIDE SERPL-SCNC: 107 MEQ/L (ref 98–107)
CK MB CFR.DF SERPL CALC: 0.37
CK MB SERPL-MCNC: < 1 NG/ML (ref ?–3.6)
CK SERPL-CCNC: 270 U/L (ref 39–308)
CO2 SERPL-SCNC: 22 MEQ/L (ref 21–32)
COLOR UR: (no result)
CREAT SERPL-MCNC: 1.36 MG/DL (ref 0.7–1.3)
EOSINOPHIL # BLD AUTO: 0 10^3/UL (ref 0–0.5)
EOSINOPHIL # BLD AUTO: 0 10^3/UL (ref 0–0.5)
EOSINOPHIL NFR BLD AUTO: 0 % (ref 0–3)
EOSINOPHIL NFR BLD AUTO: 0 % (ref 0–3)
GFR SERPL CREATININE-BSD FRML MDRD: 54.5 ML/MIN/{1.73_M2} (ref 42–?)
GLUCOSE SERPL-MCNC: 128 MG/DL (ref 70–100)
GLUCOSE UR STRIP-MCNC: NEGATIVE MG/DL
GRAN CASTS URNS QL MICRO: (no result) /LPF
HCT VFR BLD AUTO: 47.6 % (ref 42–52)
HCT VFR BLD AUTO: 48.2 % (ref 42–52)
HGB BLD-MCNC: 16.1 G/DL (ref 13.5–17.5)
HGB BLD-MCNC: 16.1 G/DL (ref 13.5–17.5)
HGB UR QL STRIP: POSITIVE
HYALINE CASTS URNS QL MICRO: (no result) /LPF (ref 0–1)
KETONES UR QL STRIP: (no result) MG/DL
LEUKOCYTE ESTERASE UR QL STRIP: NEGATIVE
LYMPHOCYTES # BLD AUTO: 0.4 10^3/UL (ref 1.5–5)
LYMPHOCYTES # BLD AUTO: 0.5 10^3/UL (ref 1.5–5)
LYMPHOCYTES NFR BLD AUTO: 3.9 % (ref 24–44)
LYMPHOCYTES NFR BLD AUTO: 4.2 % (ref 24–44)
MCH RBC QN AUTO: 33.7 PG (ref 27–33)
MCH RBC QN AUTO: 33.7 PG (ref 27–33)
MCHC RBC AUTO-ENTMCNC: 33.4 G/DL (ref 32–36.5)
MCHC RBC AUTO-ENTMCNC: 33.8 G/DL (ref 32–36.5)
MCV RBC AUTO: 100.8 FL (ref 80–96)
MCV RBC AUTO: 99.6 FL (ref 80–96)
MONOCYTES # BLD AUTO: 0.8 10^3/UL (ref 0–0.8)
MONOCYTES # BLD AUTO: 0.9 10^3/UL (ref 0–0.8)
MONOCYTES NFR BLD AUTO: 7.4 % (ref 0–5)
MONOCYTES NFR BLD AUTO: 8.7 % (ref 0–5)
MUCOUS THREADS URNS QL MICRO: (no result)
NEUTROPHILS # BLD AUTO: 8.5 10^3/UL (ref 1.5–8.5)
NEUTROPHILS # BLD AUTO: 9.9 10^3/UL (ref 1.5–8.5)
NEUTROPHILS NFR BLD AUTO: 86.8 % (ref 36–66)
NEUTROPHILS NFR BLD AUTO: 87.9 % (ref 36–66)
NITRITE UR QL STRIP: NEGATIVE
PH UR STRIP: 5.5 UNITS (ref 5–7)
PLATELET # BLD AUTO: 162 10^3/UL (ref 150–450)
PLATELET # BLD AUTO: 173 10^3/UL (ref 150–450)
POTASSIUM SERPL-SCNC: 3.6 MEQ/L (ref 3.5–5.1)
PROT SERPL-MCNC: 7.1 GM/DL (ref 6.4–8.2)
PROT UR STRIP-MCNC: (no result) MG/DL
RBC # BLD AUTO: 4.78 10^6/UL (ref 4.3–6.1)
RBC # BLD AUTO: 4.78 10^6/UL (ref 4.3–6.1)
RBC #/AREA URNS HPF: (no result) /HPF (ref 0–3)
SODIUM SERPL-SCNC: 137 MEQ/L (ref 136–145)
SP GR UR STRIP: 1.02 (ref 1–1.03)
SQUAMOUS URNS QL MICRO: (no result) /HPF
TRANS CELLS #/AREA URNS HPF: (no result) /HPF
TROPONIN I SERPL-MCNC: 0.02 NG/ML (ref ?–0.1)
UROBILINOGEN UR QL STRIP: (no result) MG/DL
WBC # BLD AUTO: 11.3 10^3/UL (ref 4–10)
WBC # BLD AUTO: 9.8 10^3/UL (ref 4–10)
WBC #/AREA URNS HPF: (no result) /HPF (ref 0–3)

## 2020-06-22 RX ADMIN — ATORVASTATIN CALCIUM SCH MG: 20 TABLET, FILM COATED ORAL at 21:00

## 2020-06-23 VITALS — SYSTOLIC BLOOD PRESSURE: 151 MMHG | DIASTOLIC BLOOD PRESSURE: 76 MMHG

## 2020-06-23 VITALS — DIASTOLIC BLOOD PRESSURE: 64 MMHG | SYSTOLIC BLOOD PRESSURE: 129 MMHG

## 2020-06-23 VITALS — SYSTOLIC BLOOD PRESSURE: 126 MMHG | DIASTOLIC BLOOD PRESSURE: 65 MMHG

## 2020-06-23 VITALS — SYSTOLIC BLOOD PRESSURE: 147 MMHG | DIASTOLIC BLOOD PRESSURE: 73 MMHG

## 2020-06-23 RX ADMIN — SODIUM CHLORIDE SCH MLS/HR: 9 INJECTION, SOLUTION INTRAVENOUS at 13:39

## 2020-06-23 RX ADMIN — SODIUM CHLORIDE SCH MLS/HR: 9 INJECTION, SOLUTION INTRAVENOUS at 20:00

## 2020-06-23 RX ADMIN — ATORVASTATIN CALCIUM SCH MG: 20 TABLET, FILM COATED ORAL at 20:00

## 2020-06-23 RX ADMIN — DOXYCYCLINE HYCLATE SCH MG: 100 TABLET, COATED ORAL at 08:37

## 2020-06-23 RX ADMIN — DOXYCYCLINE HYCLATE SCH MG: 100 TABLET, COATED ORAL at 20:00

## 2020-06-23 RX ADMIN — ENOXAPARIN SODIUM SCH MG: 40 INJECTION SUBCUTANEOUS at 08:37

## 2020-06-23 RX ADMIN — ACETAMINOPHEN PRN MG: 500 TABLET ORAL at 14:32

## 2020-06-23 RX ADMIN — SODIUM CHLORIDE SCH MLS/HR: 9 INJECTION, SOLUTION INTRAVENOUS at 05:30

## 2020-06-23 RX ADMIN — ACETAMINOPHEN PRN MG: 500 TABLET ORAL at 05:05

## 2020-06-23 RX ADMIN — Medication SCH UNITS: at 08:37

## 2020-06-23 RX ADMIN — CEFTRIAXONE SCH MLS/HR: 1 INJECTION, POWDER, FOR SOLUTION INTRAMUSCULAR; INTRAVENOUS at 10:40

## 2020-06-23 RX ADMIN — ASPIRIN SCH MG: 81 TABLET ORAL at 08:37

## 2020-06-23 NOTE — HPEPDOC
[facility] Medical History & Physical


Date of Admission


Jun 23, 2020


Date of Service:  Jun 23, 2020


Attending Physician:  GONDAL,KHUBAIB N. MD





History and Physical


CHIEF COMPLAINT: Fever





HISTORY OF PRESENT ILLNESS: 75-year-old male with past medical history of 

coronary artery disease, MI, status post stent placement 2, and hyperlipidemia 

presents with fever. Patient reports daily fever for the past 4 days, went to 

see his PCP who ordered a chest x-ray and advised him to come to the hospital if

he had another febrile episode. He reports a fever of 105 at home earlier today,

so he came to the hospital, found to have a fever of 102 in the emergency 

department. He reports minimal dyspnea/cough, no sputum production, denies any 

new chest pain, nausea, vomiting, diarrhea or constipation. He has. No other 

complaints this time.





10 point review of system is negative so for above





PAST MEDICAL HISTORY:


1. Coronary artery disease.


2. Myocardial infarction.


3. , Hyperlipidemia.





PAST SURGICAL HISTORY:


1. Coronary stent placement.


2. Hernia repair.





SOCIAL HISTORY:


Previous smoker.


Denies alcohol use.


Denies drug use





FAMILY HISTORY:


Mother had lung cancer





ALLERGIES: Please see below.





HOME MEDICATIONS: Please see below. 





PHYSICAL EXAMINATION:


VITAL SIGNS: Please see below.


GENERAL: No distress


HEENT: Normocephalic, atraumatic, moist mucous membranes


NECK: Supple


CARDIOVASCULAR EXAMINATION: S1, S2, no murmurs


RESPIRATORY EXAMINATION: Scattered rhonchi, diminished in the bases, no wheezing


ABDOMINAL EXAMINATION: Soft, nontender, nondistended, positive bowel sounds


EXTREMITIES: Range of motion intact


SKIN: No rash


NEUROLOGICAL EXAMINATION: Alert and oriented 3, no focal deficits 


PSYCHIATRIC EXAMINATION: Calm and cooperative





LABORATORY DATA: See below.





IMAGING: Chest x-ray with possible left lower lobe infiltrate





MICROBIOLOGY: Please see below. 





ASSESSMENT: 75-year-old male with past medical history of coronary artery 

disease, MI, status post stent placement and hyperlipidemia presents with fever,

is being admitted for possible pneumonia.





PLAN:


1. Fever.


 Possibly due to pneumonia based on chest x-ray, lacks classic pneumonia 

presentation as he denies any significant dyspnea or cough, pro-calcitonin 

ordered, respiratory viral panel negative, blood cultures pending, ceftr

iaxone/doxycycline.





2. Coronary artery disease.


 History of MI status post an placement, continue optimal medical management 

with aspirin, statin.





DVT prophylaxis: Lovenox.


GI prophylaxis: Not needed





Vital Signs





Vital Signs








  Date Time  Temp Pulse Resp B/P (MAP) Pulse Ox O2 Delivery O2 Flow Rate FiO2


 


6/22/20 23:35 99.7 77 24 124/61 (82) 91 Room Air  











Laboratory Data


Labs 24H


Laboratory Tests 2


6/22/20 21:38: 


Immature Granulocyte % (Auto) 0.5, Neutrophils (%) (Auto) 86.8H, Lymphocytes (%)

(Auto) 3.9L, Monocytes (%) (Auto) 8.7H, Eosinophils (%) (Auto) 0.0, Basophils 

(%) (Auto) 0.1, Neutrophils # (Auto) 8.5, Lymphocytes # (Auto) 0.4L, Monocytes #

(Auto) 0.9H, Eosinophils # (Auto) 0.0, Basophils # (Auto) 0.0, Nucleated Red 

Blood Cells % (auto) 0.0, Anion Gap 8, Glomerular Filtration Rate 54.5, Lactic 

Acid Level 2.3*H, Calcium Level 8.6L, Total Bilirubin 2.2H, Direct Bilirubin 

1.1H, Aspartate Amino Transf (AST/SGOT) 33, Alanine Aminotransferase (ALT/SGPT) 

29, Alkaline Phosphatase 122H, Total Creatine Kinase 270, Creatine Kinase MB < 

1.0, Creatine Kinase MB Relative Index 0.37, Troponin I 0.02, Total Protein 7.1,

Albumin 3.3, Albumin/Globulin Ratio 0.9


6/22/20 22:03: 


POC pH (Misc Panel) 7.453H, POC Base Excess (Misc Panel) -7.0L, POC Saturated 

Percent O2 (Misc) 95, POC pO2 (Misc Panel) 68.0L, POC pCO2 (Misc Panel) 24.5L, 

POC HCO3 (Misc Panel) 17.2L, POC Total CO2 (Misc Panel) 18.0L


6/22/20 22:14: POC Troponin I (Misc) 0.03


CBC/BMP


Laboratory Tests


6/22/20 21:38








Microbiology





Microbiology


6/22/20 Respiratory Virus Panel (PCR) (ROSY) - Final, Complete


          


6/22/20 Blood Culture, Received


          Pending


6/22/20 Blood Culture, Received


          Pending





Home Medications


Scheduled


Aspirin (Aspir 81) 81 Mg Tab, 81 MG PO DAILY


Atorvastatin Calcium (Lipitor) 10 Mg Tab, 20 MG PO QHS


Cholecalciferol (Vitamin D3) (Vitamin D3) 25 Mcg Tablet, 1,000 UNITS PO DAILY


Multivitamin (Multivitamins) 1 Cap Cap, 1 CAP PO DAILY


Zoledronic Acid/Mannitol-Water (Reclast 5 mg/100 ml Solution) 5 Mg/100 Ml 

Pggybk.btl, 5 MG IV YEARLY


   LAST RECIEVED 6/9/2020 





Scheduled PRN


Acetaminophen (Acetaminophen) 500 Mg Tablet, 500 MG PO Q6HP PRN for FEVER


Gabapentin (Gabapentin) 100 Mg Capsule, 100 MG PO TIDP PRN for PAIN





Allergies


Coded Allergies:  


     Penicillins (Verified  Allergy, Mild, RASH, 6/22/20)


     atenolol (Verified  Adverse Reaction, Mild, NIGHTMARES, FATIGUE, 6/22/20)





A-FIB/CHADSVASC


A-FIB History


Current/History of A-Fib/PAF?:  No











GONDAL,KHUBAIB N. MD           Jun 23, 2020 01:42

## 2020-06-23 NOTE — REP
REASON FOR EXAM:  Pyrexia.

 

COMPARISON:  01/24/2020

 

Since the last examination, a patchy opacity has developed in the posterior lung

fields, seen best on the lateral view, probably left lower lobe.  The pleural

angles are again seen to be sharp.  The heart is not enlarged.  The osseous

structures are stable and intact.

 

IMPRESSION:

 

Suspect left lower lobe pneumonia.

 

 

Electronically Signed by

Nas Schuster DO 06/23/2020 10:24 A

## 2020-06-23 NOTE — ECGEPIP
[facility] - ED

                                       

                                       Test Date:    2020

Pat Name:     FAVIOLA SMITH            Department:   

Patient ID:   [id number]                 Room:         [place] [address]

Gender:       Male                     Technician:   

:          1945               Requested By: BRIA GRANDA

Order Number: [id number]     Reading MD:   Kwabena Hernández

                                 Measurements

Intervals                              Axis          

Rate:         83                       P:            4

MT:           177                      QRS:          14

QRSD:         97                       T:            50

QT:           341                                    

QTc:          402                                    

                           Interpretive Statements

SINUS RHYTHM

NSTTW ABNORMALITIES

SIMILAR TO 20

Electronically Signed on 2020 7:51:58 EDT by Kwabena Hernández

## 2020-06-24 VITALS — DIASTOLIC BLOOD PRESSURE: 60 MMHG | SYSTOLIC BLOOD PRESSURE: 126 MMHG

## 2020-06-24 VITALS — DIASTOLIC BLOOD PRESSURE: 47 MMHG | SYSTOLIC BLOOD PRESSURE: 110 MMHG

## 2020-06-24 VITALS — DIASTOLIC BLOOD PRESSURE: 52 MMHG | SYSTOLIC BLOOD PRESSURE: 125 MMHG

## 2020-06-24 LAB
ALBUMIN SERPL BCG-MCNC: 2.5 GM/DL (ref 3.2–5.2)
ALT SERPL W P-5'-P-CCNC: 30 U/L (ref 12–78)
BILIRUB SERPL-MCNC: 1 MG/DL (ref 0.2–1)
BUN SERPL-MCNC: 19 MG/DL (ref 7–18)
CALCIUM SERPL-MCNC: 7.3 MG/DL (ref 8.8–10.2)
CHLORIDE SERPL-SCNC: 110 MEQ/L (ref 98–107)
CO2 SERPL-SCNC: 20 MEQ/L (ref 21–32)
CREAT SERPL-MCNC: 0.95 MG/DL (ref 0.7–1.3)
GFR SERPL CREATININE-BSD FRML MDRD: > 60 ML/MIN/{1.73_M2} (ref 42–?)
GLUCOSE SERPL-MCNC: 118 MG/DL (ref 70–100)
HCT VFR BLD AUTO: 40.6 % (ref 42–52)
HGB BLD-MCNC: 13.9 G/DL (ref 13.5–17.5)
MAGNESIUM SERPL-MCNC: 2 MG/DL (ref 1.8–2.4)
MCH RBC QN AUTO: 33.7 PG (ref 27–33)
MCHC RBC AUTO-ENTMCNC: 34.2 G/DL (ref 32–36.5)
MCV RBC AUTO: 98.5 FL (ref 80–96)
PLATELET # BLD AUTO: 127 10^3/UL (ref 150–450)
POTASSIUM SERPL-SCNC: 3.2 MEQ/L (ref 3.5–5.1)
PROT SERPL-MCNC: 5.5 GM/DL (ref 6.4–8.2)
RBC # BLD AUTO: 4.12 10^6/UL (ref 4.3–6.1)
SODIUM SERPL-SCNC: 138 MEQ/L (ref 136–145)
WBC # BLD AUTO: 7.6 10^3/UL (ref 4–10)

## 2020-06-24 RX ADMIN — Medication SCH UNITS: at 08:59

## 2020-06-24 RX ADMIN — ACETAMINOPHEN PRN MG: 500 TABLET ORAL at 16:49

## 2020-06-24 RX ADMIN — SODIUM CHLORIDE SCH MLS/HR: 9 INJECTION, SOLUTION INTRAVENOUS at 05:18

## 2020-06-24 RX ADMIN — ATORVASTATIN CALCIUM SCH MG: 20 TABLET, FILM COATED ORAL at 21:20

## 2020-06-24 RX ADMIN — GABAPENTIN PRN MG: 100 CAPSULE ORAL at 21:19

## 2020-06-24 RX ADMIN — ASPIRIN SCH MG: 81 TABLET ORAL at 08:59

## 2020-06-24 RX ADMIN — ENOXAPARIN SODIUM SCH MG: 40 INJECTION SUBCUTANEOUS at 08:59

## 2020-06-24 RX ADMIN — DOXYCYCLINE HYCLATE SCH MG: 100 TABLET, COATED ORAL at 21:20

## 2020-06-24 RX ADMIN — DOXYCYCLINE HYCLATE SCH MG: 100 TABLET, COATED ORAL at 08:59

## 2020-06-24 RX ADMIN — CEFTRIAXONE SCH MLS/HR: 1 INJECTION, POWDER, FOR SOLUTION INTRAMUSCULAR; INTRAVENOUS at 09:01

## 2020-06-24 RX ADMIN — ACETAMINOPHEN PRN MG: 500 TABLET ORAL at 09:33

## 2020-06-24 RX ADMIN — ACETAMINOPHEN PRN MG: 500 TABLET ORAL at 22:55

## 2020-06-24 NOTE — IPNPDOC
Date Seen


The patient was seen on 6/24/20.





Progress Note


SUBJECTIVE: 


Patient seen in the morning, reports diarrhea, no other complaints. He had 

another episode of fever last night.





10 point review of system is negative so for above





PHYSICAL EXAMINATION:


VITAL SIGNS: Please see below.


GENERAL: No distress


HEENT: Normocephalic, atraumatic, moist mucous membranes


NECK: Supple


CARDIOVASCULAR EXAMINATION: S1, S2, no murmurs


RESPIRATORY EXAMINATION: Scattered rhonchi, diminished in the bases, no wheezing


ABDOMINAL EXAMINATION: Soft, nontender, nondistended, positive bowel sounds


EXTREMITIES: Range of motion intact


SKIN: No rash


NEUROLOGICAL EXAMINATION: Alert and oriented 3, no focal deficits 


PSYCHIATRIC EXAMINATION: Calm and cooperative





LABORATORY DATA: See below.





MICROBIOLOGY: Please see below. 





ASSESSMENT: 75-year-old male with past medical history of coronary artery 

disease, MI, status post stent placement and hyperlipidemia presents with fever,

is being admitted for possible pneumonia.





PLAN:


1. Fever.


 Possibly due to pneumonia based on chest x-ray, lacks classic pneumonia 

presentation as he denies any significant dyspnea or cough, pro-calcitonin 

elevated, respiratory viral panel negative, blood cultures pending, ceftriaxone

/doxycycline. GI panel ordered for diarrhea.





2. Coronary artery disease.


 History of MI status post an placement, continue optimal medical management 

with aspirin, statin.





DVT prophylaxis: Lovenox.


GI prophylaxis: Not needed





VS, I&O, 24H, Fishbone


Vital Signs/I&O





Vital Signs








  Date Time  Temp Pulse Resp B/P (MAP) Pulse Ox O2 Delivery O2 Flow Rate FiO2


 


6/24/20 06:00 99.1 72 21 110/47 (68) 92 Room Air  














I&O- Last 24 Hours up to 6 AM 


 


 6/24/20





 06:00


 


Intake Total 520 ml


 


Balance 520 ml











Laboratory Data


24H LABS


Laboratory Tests 2


6/24/20 05:57: 


Nucleated Red Blood Cells % (auto) 0.0, Anion Gap 8, Glomerular Filtration Rate 

> 60.0, Calcium Level 7.3#L, Magnesium Level 2.0, Total Bilirubin 1.0#, 

Aspartate Amino Transf (AST/SGOT) 59H, Alanine Aminotransferase (ALT/SGPT) 30, 

Alkaline Phosphatase 86, Total Protein 5.5#L, Albumin 2.5#L, Albumin/Globulin 

Ratio 0.8


CBC/BMP


Laboratory Tests


6/24/20 05:57








Microbiology





Microbiology


6/22/20 Respiratory Virus Panel (PCR) (ROSY) - Final, Complete


          


6/22/20 Blood Culture - Preliminary, Resulted


          No growth after 24 hours . All specim...


6/22/20 Blood Culture - Preliminary, Resulted


          No growth after 24 hours . All specim...











GONDAL,KHUBAIB N. MD           Jun 24, 2020 12:27

## 2020-06-25 VITALS — DIASTOLIC BLOOD PRESSURE: 55 MMHG | SYSTOLIC BLOOD PRESSURE: 108 MMHG

## 2020-06-25 VITALS — DIASTOLIC BLOOD PRESSURE: 64 MMHG | SYSTOLIC BLOOD PRESSURE: 131 MMHG

## 2020-06-25 VITALS — DIASTOLIC BLOOD PRESSURE: 66 MMHG | SYSTOLIC BLOOD PRESSURE: 140 MMHG

## 2020-06-25 LAB
BUN SERPL-MCNC: 16 MG/DL (ref 7–18)
CALCIUM SERPL-MCNC: 7.8 MG/DL (ref 8.8–10.2)
CHLORIDE SERPL-SCNC: 113 MEQ/L (ref 98–107)
CO2 SERPL-SCNC: 21 MEQ/L (ref 21–32)
CREAT SERPL-MCNC: 0.96 MG/DL (ref 0.7–1.3)
GFR SERPL CREATININE-BSD FRML MDRD: > 60 ML/MIN/{1.73_M2} (ref 42–?)
GLUCOSE SERPL-MCNC: 106 MG/DL (ref 70–100)
HCT VFR BLD AUTO: 40.2 % (ref 42–52)
HGB BLD-MCNC: 14 G/DL (ref 13.5–17.5)
MCH RBC QN AUTO: 34 PG (ref 27–33)
MCHC RBC AUTO-ENTMCNC: 34.8 G/DL (ref 32–36.5)
MCV RBC AUTO: 97.6 FL (ref 80–96)
PLATELET # BLD AUTO: 134 10^3/UL (ref 150–450)
POTASSIUM SERPL-SCNC: 3.6 MEQ/L (ref 3.5–5.1)
RBC # BLD AUTO: 4.12 10^6/UL (ref 4.3–6.1)
SODIUM SERPL-SCNC: 142 MEQ/L (ref 136–145)
WBC # BLD AUTO: 6.9 10^3/UL (ref 4–10)

## 2020-06-25 RX ADMIN — ACETAMINOPHEN PRN MG: 500 TABLET ORAL at 15:22

## 2020-06-25 RX ADMIN — CEFTRIAXONE SCH MLS/HR: 1 INJECTION, POWDER, FOR SOLUTION INTRAMUSCULAR; INTRAVENOUS at 09:44

## 2020-06-25 RX ADMIN — Medication SCH UNITS: at 09:44

## 2020-06-25 RX ADMIN — ACETAMINOPHEN PRN MG: 500 TABLET ORAL at 05:37

## 2020-06-25 RX ADMIN — ENOXAPARIN SODIUM SCH MG: 40 INJECTION SUBCUTANEOUS at 09:44

## 2020-06-25 RX ADMIN — ATORVASTATIN CALCIUM SCH MG: 20 TABLET, FILM COATED ORAL at 20:14

## 2020-06-25 RX ADMIN — DOXYCYCLINE HYCLATE SCH MG: 100 TABLET, COATED ORAL at 09:44

## 2020-06-25 RX ADMIN — DOXYCYCLINE HYCLATE SCH MG: 100 TABLET, COATED ORAL at 20:14

## 2020-06-25 RX ADMIN — ASPIRIN SCH MG: 81 TABLET ORAL at 09:44

## 2020-06-25 RX ADMIN — ACETAMINOPHEN PRN MG: 500 TABLET ORAL at 21:34

## 2020-06-25 NOTE — IPNPDOC
Date Seen


The patient was seen on 6/25/20.





Progress Note


SUBJECTIVE: 


Patient seen in the morning, reports that diarrhea has completely resolved, no 

additional complaints. Patient has not gotten out of bed or ambulated much since

admission, no other complaints.





10 point review of system is negative so for above





PHYSICAL EXAMINATION:


VITAL SIGNS: Please see below.


GENERAL: No distress


HEENT: Normocephalic, atraumatic, moist mucous membranes


NECK: Supple


CARDIOVASCULAR EXAMINATION: S1, S2, no murmurs


RESPIRATORY EXAMINATION: Scattered rhonchi, diminished in the bases, no wheezing


ABDOMINAL EXAMINATION: Soft, nontender, nondistended, positive bowel sounds


EXTREMITIES: Range of motion intact


SKIN: No rash


NEUROLOGICAL EXAMINATION: Alert and oriented 3, no focal deficits 


PSYCHIATRIC EXAMINATION: Calm and cooperative





LABORATORY DATA: See below.





MICROBIOLOGY: Please see below. 





ASSESSMENT: 75-year-old male with past medical history of coronary artery 

disease, MI, status post stent placement and hyperlipidemia presents with fever,

is being admitted for possible pneumonia.





PLAN:


1. Pneumonia


 pro-calcitonin elevated, respiratory viral panel negative, blood cultures 

negative to date, clinically improving, continue ceftriaxone/doxycycline. GI 

panel negative. PT eval prior to discharge.





2. Coronary artery disease.


 History of MI status post an placement, continue optimal medical management 

with aspirin, statin.





DVT prophylaxis: Lovenox.


GI prophylaxis: Not needed





VS, I&O, 24H, Fishbone


Vital Signs/I&O





Vital Signs








  Date Time  Temp Pulse Resp B/P (MAP) Pulse Ox O2 Delivery O2 Flow Rate FiO2


 


6/25/20 06:28 99.5       


 


6/25/20 06:00  73 20 131/64 (86) 96 Room Air  














I&O- Last 24 Hours up to 6 AM 


 


 6/25/20





 06:00


 


Intake Total 820 ml


 


Balance 820 ml











Laboratory Data


24H LABS


Laboratory Tests 2


6/25/20 06:18: 


Nucleated Red Blood Cells % (auto) 0.0, Anion Gap 8, Glomerular Filtration Rate 

> 60.0, Calcium Level 7.8L


CBC/BMP


Laboratory Tests


6/25/20 06:18








Microbiology





Microbiology


6/24/20 Gastrointestinal Tract Panel (PCR) - Final, Complete


          


6/22/20 Respiratory Virus Panel (PCR) ([facility]) - Final, Complete


          


6/22/20 Blood Culture - Preliminary, Resulted


          No Growth after 48 hours. All Specime...


6/22/20 Blood Culture - Preliminary, Resulted


          No Growth after 48 hours. All Specime...











GONDAL,KHUBAIB N. MD           Jun 25, 2020 13:53

## 2020-06-26 VITALS — SYSTOLIC BLOOD PRESSURE: 113 MMHG | DIASTOLIC BLOOD PRESSURE: 57 MMHG

## 2020-06-26 VITALS — SYSTOLIC BLOOD PRESSURE: 122 MMHG | DIASTOLIC BLOOD PRESSURE: 70 MMHG

## 2020-06-26 VITALS — DIASTOLIC BLOOD PRESSURE: 46 MMHG | SYSTOLIC BLOOD PRESSURE: 107 MMHG

## 2020-06-26 LAB
BUN SERPL-MCNC: 14 MG/DL (ref 7–18)
CALCIUM SERPL-MCNC: 7.6 MG/DL (ref 8.8–10.2)
CHLORIDE SERPL-SCNC: 110 MEQ/L (ref 98–107)
CO2 SERPL-SCNC: 21 MEQ/L (ref 21–32)
CREAT SERPL-MCNC: 0.8 MG/DL (ref 0.7–1.3)
GFR SERPL CREATININE-BSD FRML MDRD: > 60 ML/MIN/{1.73_M2} (ref 42–?)
GLUCOSE SERPL-MCNC: 99 MG/DL (ref 70–100)
HCT VFR BLD AUTO: 40.9 % (ref 42–52)
HGB BLD-MCNC: 14.1 G/DL (ref 13.5–17.5)
MCH RBC QN AUTO: 33.7 PG (ref 27–33)
MCHC RBC AUTO-ENTMCNC: 34.5 G/DL (ref 32–36.5)
MCV RBC AUTO: 97.6 FL (ref 80–96)
PLATELET # BLD AUTO: 148 10^3/UL (ref 150–450)
POTASSIUM SERPL-SCNC: 3.4 MEQ/L (ref 3.5–5.1)
RBC # BLD AUTO: 4.19 10^6/UL (ref 4.3–6.1)
SODIUM SERPL-SCNC: 139 MEQ/L (ref 136–145)
WBC # BLD AUTO: 6.1 10^3/UL (ref 4–10)

## 2020-06-26 RX ADMIN — DOXYCYCLINE HYCLATE SCH MG: 100 TABLET, COATED ORAL at 20:43

## 2020-06-26 RX ADMIN — ACETAMINOPHEN PRN MG: 500 TABLET ORAL at 10:15

## 2020-06-26 RX ADMIN — CEFTRIAXONE SCH MLS/HR: 1 INJECTION, POWDER, FOR SOLUTION INTRAMUSCULAR; INTRAVENOUS at 10:15

## 2020-06-26 RX ADMIN — ENOXAPARIN SODIUM SCH MG: 40 INJECTION SUBCUTANEOUS at 10:14

## 2020-06-26 RX ADMIN — ACETAMINOPHEN PRN MG: 500 TABLET ORAL at 20:50

## 2020-06-26 RX ADMIN — Medication SCH UNITS: at 10:15

## 2020-06-26 RX ADMIN — ATORVASTATIN CALCIUM SCH MG: 20 TABLET, FILM COATED ORAL at 20:43

## 2020-06-26 RX ADMIN — ASPIRIN SCH MG: 81 TABLET ORAL at 10:15

## 2020-06-26 RX ADMIN — DOXYCYCLINE HYCLATE SCH MG: 100 TABLET, COATED ORAL at 10:14

## 2020-06-26 NOTE — IPNPDOC
Subjective


Date Seen


The patient was seen on 6/26/20.





Subjective


Chief Complaint/HPI


Patient is comfortable in no distress. Offers no new complaints


General:  Denies: ROS Unobtainable, Chills, Night Sweats, Fatigue, Malaise, 

Normal Appetite, Other Symptoms


Constitutional:  Denies: Chills, Fever, Malaise, Night Sweats, Weakness, 

Fatigue, Weight Loss, Lethargy, Other


Skin:  Denies: Rash, Lesions, Jaundice, Bruising, Itching, Dry, Breakdown, Nail 

Changes, Other


Pulmonary:  Denies: Dyspnea, Cough, Pleuritic Chest Pain, Other Symptoms


Cardiovascular:  Denies: Chest Pain, Palpitations, Orthopnea, Paroxysmal Noc. 

Dyspnea, Edema, Lt Headedness, Other Symptoms


Genitourinary:  Denies: Dysuria, Frequency, Incontinence, Hematuria, Retention, 

Other Symptoms


Musculoskeletal:  Denies: Neck Pain, Back Pain, Shoulder Pain, Arm Pain, Hand 

Pain, Leg Pain, Foot Pain, Joint Pain, Muscle Pain, Spasms, Other Symptoms


Neurological:  Denies: Weakness, Numbness, Incoordination, Change in speech, 

Confusion, Seizures, Other Symptoms





Objective


Physical Examination


General Exam:  Positive: Alert, Cooperative


Eye Exam:  Positive: PERRLA, Conjunctiva & lids normal


ENT Exam:  Positive: Atraumatic


Neck Exam:  Positive: Supple


Chest Exam:  Positive: Clear to auscultation, Normal air movement


Heart Exam:  Positive: Rate Normal, Normal S1, Normal S2


Abdomen Exam:  Positive: Normal bowel sounds, Soft


Extremity Exam:  Positive: Normal pulses


Skin Exam:  Positive: Nl turgor and temperature


Neuro Exam:  Positive: Strength at 5/5 X4 ext, Cranial Nerves 3-12 NL





Assessment /Plan


Problems





(1) Pneumonia


Status:  Acute


Problem Text:  pro-calcitonin elevated, respiratory viral panel negative, blood 

cultures negative to date, clinically improving,


continue ceftriaxone/doxycycline. GI panel negative. 


Once cleared from physical therapy be discharged home


 





(2) Hypokalemia


Status:  Acute


Problem Text:  Potassium supplemented ordered


Check labs in a.m.





(3) Hyperlipidemia


Status:  Chronic


Problem Text:  Continue home meds





(4) CAD (coronary artery disease)


Status:  Chronic


Problem Text:  Continue home meds








Plan/VTE


VTE Prophylaxis Ordered?:  Yes





VS, I&O, 24H, Fishbone


Vital Signs/I&O





Vital Signs








  Date Time  Temp Pulse Resp B/P (MAP) Pulse Ox O2 Delivery O2 Flow Rate FiO2


 


6/26/20 06:00 99.8 68 18 107/46 (66) 92 Room Air  














I&O- Last 24 Hours up to 6 AM 


 


 6/26/20





 06:00


 


Intake Total 160 ml


 


Balance 160 ml











Laboratory Data


24H LABS


Laboratory Tests 2


6/26/20 06:12: 


Nucleated Red Blood Cells % (auto) 0.0, Anion Gap 8, Glomerular Filtration Rate 

> 60.0, Calcium Level 7.6L


CBC/BMP


Laboratory Tests


6/26/20 06:12








Microbiology





Microbiology


6/24/20 Gastrointestinal Tract Panel (PCR) - Final, Complete


          


6/22/20 Respiratory Virus Panel (PCR) ([facility]) - Final, Complete


          


6/22/20 Blood Culture - Preliminary, Resulted


          No Growth after 72 hours. All specime...


6/22/20 Blood Culture - Preliminary, Resulted


          No Growth after 72 hours. All specime...











YON FINK MD              Jun 26, 2020 10:53

## 2020-06-27 VITALS — SYSTOLIC BLOOD PRESSURE: 121 MMHG | DIASTOLIC BLOOD PRESSURE: 50 MMHG

## 2020-06-27 LAB
ALBUMIN SERPL BCG-MCNC: 2.4 GM/DL (ref 3.2–5.2)
ALT SERPL W P-5'-P-CCNC: 60 U/L (ref 12–78)
BASOPHILS # BLD AUTO: 0 10^3/UL (ref 0–0.2)
BASOPHILS NFR BLD AUTO: 0.4 % (ref 0–1)
BILIRUB SERPL-MCNC: 1.3 MG/DL (ref 0.2–1)
BUN SERPL-MCNC: 15 MG/DL (ref 7–18)
CALCIUM SERPL-MCNC: 7.9 MG/DL (ref 8.8–10.2)
CHLORIDE SERPL-SCNC: 111 MEQ/L (ref 98–107)
CO2 SERPL-SCNC: 22 MEQ/L (ref 21–32)
CREAT SERPL-MCNC: 0.8 MG/DL (ref 0.7–1.3)
EOSINOPHIL # BLD AUTO: 0.2 10^3/UL (ref 0–0.5)
EOSINOPHIL NFR BLD AUTO: 2.7 % (ref 0–3)
GFR SERPL CREATININE-BSD FRML MDRD: > 60 ML/MIN/{1.73_M2} (ref 42–?)
GLUCOSE SERPL-MCNC: 94 MG/DL (ref 70–100)
HCT VFR BLD AUTO: 43 % (ref 42–52)
HGB BLD-MCNC: 14.8 G/DL (ref 13.5–17.5)
LYMPHOCYTES # BLD AUTO: 1.1 10^3/UL (ref 1.5–5)
LYMPHOCYTES NFR BLD AUTO: 15.4 % (ref 24–44)
MCH RBC QN AUTO: 33.6 PG (ref 27–33)
MCHC RBC AUTO-ENTMCNC: 34.4 G/DL (ref 32–36.5)
MCV RBC AUTO: 97.7 FL (ref 80–96)
MONOCYTES # BLD AUTO: 0.7 10^3/UL (ref 0–0.8)
MONOCYTES NFR BLD AUTO: 10.3 % (ref 0–5)
NEUTROPHILS # BLD AUTO: 5.1 10^3/UL (ref 1.5–8.5)
NEUTROPHILS NFR BLD AUTO: 70.5 % (ref 36–66)
PLATELET # BLD AUTO: 199 10^3/UL (ref 150–450)
POTASSIUM SERPL-SCNC: 3.6 MEQ/L (ref 3.5–5.1)
PROT SERPL-MCNC: 6.1 GM/DL (ref 6.4–8.2)
RBC # BLD AUTO: 4.4 10^6/UL (ref 4.3–6.1)
SODIUM SERPL-SCNC: 143 MEQ/L (ref 136–145)
WBC # BLD AUTO: 7.2 10^3/UL (ref 4–10)

## 2020-06-27 RX ADMIN — ACETAMINOPHEN PRN MG: 500 TABLET ORAL at 06:05

## 2020-06-27 RX ADMIN — DOXYCYCLINE HYCLATE SCH MG: 100 TABLET, COATED ORAL at 09:16

## 2020-06-27 RX ADMIN — ASPIRIN SCH MG: 81 TABLET ORAL at 09:16

## 2020-06-27 RX ADMIN — ENOXAPARIN SODIUM SCH MG: 40 INJECTION SUBCUTANEOUS at 09:17

## 2020-06-27 RX ADMIN — CEFTRIAXONE SCH MLS/HR: 1 INJECTION, POWDER, FOR SOLUTION INTRAMUSCULAR; INTRAVENOUS at 09:16

## 2020-06-27 RX ADMIN — GABAPENTIN PRN MG: 100 CAPSULE ORAL at 09:16

## 2020-06-27 RX ADMIN — Medication SCH UNITS: at 09:16

## 2020-06-27 NOTE — DS.PDOC
Discharge Summary


General


Date of Admission


Jun 23, 2020 at 01:34


Date of Discharge


6/27/20





Discharge Summary


PROCEDURES PERFORMED DURING STAY: None.





ADMITTING DIAGNOSES: 


1. Pneumonia.





DISCHARGE DIAGNOSES:


1. Community acquired pneumonia. This, CAD, status post MI, status post cardiac 

stent placement, hyperlipidemia.





COMPLICATIONS/CHIEF COMPLAINT: Pneumonia.





HISTORY OF PRESENT ILLNESS: HISTORY OF PRESENT ILLNESS: 75-year-old male with 

past medical history of coronary artery disease, MI, status post stent placement

2, and hyperlipidemia presents with fever. Patient reports daily fever for the 

past 4 days, went to see his PCP who ordered a chest x-ray and advised him to 

come to the hospital if he had another febrile episode. He reports a fever of 

105 at home earlier today, so he came to the hospital, found to have a fever of 

102 in the emergency department. He reports minimal dyspnea/cough, no sputum 

production, denies any new chest pain, nausea, vomiting, diarrhea or 

constipation. He has. No other complaints this time..





HOSPITAL COURSE:  


75 years old white male with past medical history of CAD, MI, status post 

coronary stents, hyperlipidemia, was admitted with the chief complaints of fever

and was found to have a pneumonia on chest x-ray.


 pro-calcitonin elevated, respiratory viral panel negative, blood cultures 

negative to date, clinically improving,


She was started on ceftriaxone and oxacillin, which she responded very well


His WBC count is within normal limits. He is afebrile, asymptomatic and has been

cleared by physical therapy to be discharged home


Fortunately, his girlfriend who is a nurse is also available to take him home 

today


Patient will be discharged home on by mouth doxycycline 100 mg by mouth twice a 

day for one week but unfortunately, his insurance does not cover cefpodoxime, 

hence we will give him Keflex 500 mg by mouth 3 times a day for one week'


[place] has been advised to follow with his PCP for further care and will 

continue all his home medications


 . 





DISCHARGE MEDICATIONS: Please see below.


 


ALLERGIES: Please see below.





PHYSICAL EXAMINATION ON DISCHARGE:


VITAL SIGNS: Please see below.


GENERAL: Within normal limits


HEENT: PERRLA. Extraocular muscles intact


NECK: Supple. Negative JVD, negative lymphadenopathy


CARDIOVASCULAR EXAMINATION: S1, S2, regular


RESPIRATORY EXAMINATION: Clear to A&P


ABDOMINAL EXAMINATION: , Soft, nontender, bowel sound present


EXTREMITIES: No clubbing, cyanosis, edema


SKIN: Within normal limits


NEUROLOGICAL EXAMINATION: . No focal motor sensory deficit 


PSYCHIATRIC EXAMINATION: Within normal limits





LABORATORY DATA: Please see below.





IMAGING: Chest x-ray: 


IMPRESSION:


 


Suspect left lower lobe pneumonia.





PROGNOSIS: Good





ACTIVITY: As tolerated.





DIET: Clear to A&P





DISCHARGE PLAN: Discharge home





DISPOSITION: . Home





DISCHARGE INSTRUCTIONS:


1. As per discharge instructions.





ITEMS TO FOLLOWUP ON ON OUTPATIENT:


1. Follow with PCP in one week.





DISCHARGE CONDITION: Stable.





TIME SPENT ON DISCHARGE: 38 minutes.





Vital Signs/I&Os





Vital Signs








  Date Time  Temp Pulse Resp B/P (MAP) Pulse Ox O2 Delivery O2 Flow Rate FiO2


 


6/27/20 06:00 99.4 65 18 121/50 (73) 92 Room Air  














I&O- Last 24 Hours up to 6 AM 


 


 6/27/20





 06:00


 


Intake Total 1540 ml


 


Output Total 1 ml


 


Balance 1539 ml











Laboratory Data


Labs 24H


Laboratory Tests 2


6/27/20 06:24: 


Immature Granulocyte % (Auto) 0.7, Neutrophils (%) (Auto) 70.5H, Lymphocytes (%)

(Auto) 15.4L, Monocytes (%) (Auto) 10.3H, Eosinophils (%) (Auto) 2.7, Basophils 

(%) (Auto) 0.4, Neutrophils # (Auto) 5.1, Lymphocytes # (Auto) 1.1L, Monocytes #

(Auto) 0.7, Eosinophils # (Auto) 0.2, Basophils # (Auto) 0.0, Nucleated Red 

Blood Cells % (auto) 0.0, Anion Gap 10, Glomerular Filtration Rate > 60.0, 

Calcium Level 7.9L, Total Bilirubin 1.3H, Aspartate Amino Transf (AST/SGOT) 74H,

Alanine Aminotransferase (ALT/SGPT) 60, Alkaline Phosphatase 179H, Total Protein

6.1L, Albumin 2.4L, Albumin/Globulin Ratio 0.6


CBC/BMP


Laboratory Tests


6/27/20 06:24











Microbiology





Microbiology


6/24/20 Gastrointestinal Tract Panel (PCR) - Final, Complete


          


6/22/20 Respiratory Virus Panel (PCR) ([facility]) - Final, Complete


          


6/22/20 Blood Culture - Preliminary, Resulted


          No Growth after 72 hours. All specime...


6/22/20 Blood Culture - Preliminary, Resulted


          No Growth after 72 hours. All specime...





Discharge Medications


Scheduled


Aspirin (Aspir 81) 81 Mg Tab, 81 MG PO DAILY, (Reported)


Atorvastatin Calcium (Lipitor) 10 Mg Tab, 20 MG PO QHS, (Reported)


Cephalexin (Keflex) 500 Mg Capsule, 1 CAP PO TID


Cholecalciferol (Vitamin D3) (Vitamin D3) 25 Mcg Tablet, 1,000 UNITS PO DAILY, 

(Reported)


Doxycycline Hyclate (Doxycycline Hyclate) 100 Mg Tablet, 100 MG PO BID


L.acidoph/L.bulg/B.bif/S.therm (Bacid Caplet) 1 Each Tablet, 1 TAB PO BID


Multivitamin (Multivitamins) 1 Cap Cap, 1 CAP PO DAILY, (Reported)


Zoledronic Acid/Mannitol-Water (Reclast 5 mg/100 ml Solution) 5 Mg/100 Ml 

Pggybk.btl, 5 MG IV YEARLY, (Reported)


   LAST RECIEVED 6/9/2020 





Scheduled PRN


Acetaminophen (Acetaminophen) 500 Mg Tablet, 500 MG PO Q6HP PRN for FEVER, 

(Reported)


Gabapentin (Gabapentin) 100 Mg Capsule, 100 MG PO TIDP PRN for PAIN, (Reported)





Allergies


Coded Allergies:  


     Penicillins (Verified  Allergy, Mild, RASH, 6/22/20)


     atenolol (Verified  Adverse Reaction, Mild, NIGHTMARES, FATIGUE, 6/22/20)











YON FINK MD              Jun 27, 2020 13:57

## 2020-08-28 ENCOUNTER — HOSPITAL ENCOUNTER (OUTPATIENT)
Dept: HOSPITAL 53 - M PLALAB | Age: 75
End: 2020-08-28
Attending: INTERNAL MEDICINE
Payer: MEDICARE

## 2020-08-28 DIAGNOSIS — M81.0: Primary | ICD-10-CM

## 2020-08-28 LAB
BUN SERPL-MCNC: 11 MG/DL (ref 7–18)
CALCIUM SERPL-MCNC: 9.3 MG/DL (ref 8.8–10.2)
CHLORIDE SERPL-SCNC: 108 MEQ/L (ref 98–107)
CO2 SERPL-SCNC: 28 MEQ/L (ref 21–32)
CREAT SERPL-MCNC: 1.1 MG/DL (ref 0.7–1.3)
GFR SERPL CREATININE-BSD FRML MDRD: > 60 ML/MIN/{1.73_M2} (ref 42–?)
GLUCOSE SERPL-MCNC: 141 MG/DL (ref 70–100)
POTASSIUM SERPL-SCNC: 4.2 MEQ/L (ref 3.5–5.1)
SODIUM SERPL-SCNC: 143 MEQ/L (ref 136–145)

## 2020-08-31 ENCOUNTER — HOSPITAL ENCOUNTER (OUTPATIENT)
Dept: HOSPITAL 53 - M LAB REF | Age: 75
End: 2020-08-31
Attending: NURSE PRACTITIONER
Payer: MEDICARE

## 2020-08-31 DIAGNOSIS — N20.0: Primary | ICD-10-CM

## 2020-08-31 LAB
CALCIUM 24H UR-MRATE: 159.6 MG/24HR (ref 42–353)
CREAT 24H UR-MRATE: 1207.4 MG/24HR (ref 950–2500)
CREAT UR-MCNC: 77.9 MG/DL

## 2020-09-23 ENCOUNTER — HOSPITAL ENCOUNTER (OUTPATIENT)
Dept: HOSPITAL 53 - M PLAIMG | Age: 75
End: 2020-09-23
Attending: NURSE PRACTITIONER
Payer: MEDICARE

## 2020-09-23 DIAGNOSIS — N20.0: Primary | ICD-10-CM

## 2020-10-01 NOTE — REPPI
KUB ABDOMEN AND PELVIS 



HISTORY: Kidney stones. 



COMPARISON: CT 01/06/2020. 



TECHNIQUE: Two AP views of the abdomen and pelvis are performed. 



FINDINGS: 

There is no evidence of bowel obstruction. There is a 9-mm gallstone in the 
right upper quadrant. There are clustered irregular calcifications in the right 
mid abdomen at the L2-3 level, which appear to represent mesenteric 
calcifications on the prior CT scan. I see no definite renal or ureteral 
calculi; however, bowel gas and fecal material limit evaluation for renal and 
ureteral calculi. There are degenerative changes of the spine.   



IMPRESSION: 

Limited evaluation for renal and ureteral calculi due to overlying bowel gas and
fecal material. No definite renal or ureteral calculi seen. There appears to be 
a 9-mm gallstone in the right upper quadrant.

MTDD

## 2021-03-30 ENCOUNTER — HOSPITAL ENCOUNTER (OUTPATIENT)
Dept: HOSPITAL 53 - M ADAMS | Age: 76
End: 2021-03-30
Attending: FAMILY MEDICINE
Payer: MEDICARE

## 2021-03-30 ENCOUNTER — HOSPITAL ENCOUNTER (OUTPATIENT)
Dept: HOSPITAL 53 - M LABDRWAD | Age: 76
End: 2021-03-30
Attending: INTERNAL MEDICINE
Payer: MEDICARE

## 2021-03-30 DIAGNOSIS — M54.5: Primary | ICD-10-CM

## 2021-03-30 DIAGNOSIS — M80.00XA: Primary | ICD-10-CM

## 2021-03-30 DIAGNOSIS — M80.00XA: ICD-10-CM

## 2021-03-30 DIAGNOSIS — M54.6: ICD-10-CM

## 2021-03-30 LAB
25(OH)D3 SERPL-MCNC: 32.1 NG/ML (ref 30–100)
CALCIUM SERPL-MCNC: 9.3 MG/DL (ref 8.8–10.2)

## 2021-03-30 PROCEDURE — 82306 VITAMIN D 25 HYDROXY: CPT

## 2021-03-30 PROCEDURE — 82310 ASSAY OF CALCIUM: CPT

## 2021-03-30 PROCEDURE — 36415 COLL VENOUS BLD VENIPUNCTURE: CPT

## 2021-03-30 PROCEDURE — 72072 X-RAY EXAM THORAC SPINE 3VWS: CPT

## 2021-03-30 PROCEDURE — 72110 X-RAY EXAM L-2 SPINE 4/>VWS: CPT

## 2021-03-30 NOTE — REP
INDICATION:

LOW BACK PAIN.



COMPARISON:

CT 02/26/2016; lateral chest 06/22/2020; lumbar spine 03/30/2021, 09/23/2020.



TECHNIQUE:

Three views including swimmer's projection cervicothoracic junction



FINDINGS:

Grade 2 compression deformity of T12 with minimal superior endplate depression of T11

noted.  A grade 1 compression deformity of T8 is seen in these are as noted on the

lateral chest x-ray 06/22/2020 T8 compression deformity is seen on MRI 08/25/2016 with

the T12 normal at that time.  No compression deformities in the upper thoracic spine

or lower cervical spine.  Pedicles, spinous and transverse processes as well as

posterior rib articulations were intact there is atherosclerotic calcification of the

aortic arch and descending aorta with some ectasia.  Medial clavicles and posterior

ribs grossly intact.  The cervicothoracic alignment unremarkable.



IMPRESSION:

Stable T8 compression deformity and grade 2 T12 compression deformity is seen on the

lateral chest 06/22/2020 and on prior lumbar radiographs.  Minor superior endplate

depression of T11.





<Electronically signed by Jorge Cason > 03/30/21 [id number]

## 2021-03-30 NOTE — REP
INDICATION:

LOW BACK PAIN.



COMPARISON:

08/20/2018



TECHNIQUE:

Five views



FINDINGS:

Five views lumbar spine show stable wedge compression deformities of T12, L2 and L4,

greatest at T12.  There is minimal superior endplate depression at T11 as well, stable

disc space narrowing at L5-S1.  No new compression deformity or destructive lesions.

Some focal aneurysmal dilatation of the abdominal aorta at the level of L2 is seen and

unchanged heavily calcified atheroma throughout the abdominal aorta and iliac vessels.

Facet arthropathy at L4-5 and L5-S1.  There is no spondylolysis or spondylolisthesis.

SI joints symmetric, unchanged.  Are calcifications adjacent to the superior aspect of

the right L2 transverse process and also further in the right upper quadrant overlying

the 2nd rib but moved in relationship to it from previous study.



IMPRESSION:

1. Chronic superior endplate depression T12, L2 and L4 unchanged with minimal endplate

depression T11 also stable.  There is no malalignment or subluxation.

2. L4-5 and L5-S1 facet arthropathy without spondylolysis.  No spondylolisthesis.

3. Atherosclerotic calcification of abdominal aorta with some aneurysmal dilatation at

the L2 level for the abdominal aorta unchanged from 2-1/2 years ago.  No other

significant finding.



.





<Electronically signed by Jorge Cason > 03/30/21 [id number]

## 2021-04-05 ENCOUNTER — HOSPITAL ENCOUNTER (OUTPATIENT)
Dept: HOSPITAL 53 - M WHC | Age: 76
End: 2021-04-05
Attending: INTERNAL MEDICINE
Payer: MEDICARE

## 2021-04-05 DIAGNOSIS — M81.0: Primary | ICD-10-CM

## 2021-04-05 NOTE — DEXAMM
INDICATION:

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



COMPARISON:

04/04/2019, 12/01/2016.



TECHNIQUE:

Bone density was measured using dual-energy x-ray absorptiometry (DEXA).



FINDINGS:

AP SPINE L1-L4

BMD 1.118 g/cm2

Young Adult T-Score -0.6

Age Matched Z-Score -0.3.



LT FEMUR, TOTAL

BMD 0.888 g/cm2

Young Adult T-Score -0.9

Age Matched Z-Score -0.6.



LT NECK

BMD 0.797 g/cm2

Young Adult T-Score -1.7

Age Matched Z-Score -0.7.



RT FEMUR, TOTAL

BMD 0.882 g/cm2

Young Adult T-Score -1.0

Age Matched Z-Score -0.6.



RT NECK

BMD 0.778 g/cm2

Young Adult T-Score -1.9

Age Matched Z-Score -0.9.



IMPRESSION:

There is normal bone density of the spine.



There is low bone density of the left hip.





There is low bone density of the right hip.



The density of the spine has increased 23.5% since the initial exam on 12/01/2016.





The density of the spine increased 12.9% since most recent exam on 04/04/2019.





The density of the left hip has increased 0.7% since initial exam on 12/01/2016.





The density of the left hip has increased 3.4% since most recent exam on 04/04/2019.





The density of the right hip has increased 2.0% since the initial exam on 12/01/2016.





The density of the right hip has increased 3.9% since the most recent exam on

04/04/2019.



FOLLOW-UP:

Recommendation for the next bone density exam: 2 years.





<Electronically signed by Juan Lerma > 04/05/21 [id number]

## 2021-06-09 ENCOUNTER — HOSPITAL ENCOUNTER (OUTPATIENT)
Dept: HOSPITAL 53 - M PLALAB | Age: 76
End: 2021-06-09
Attending: FAMILY MEDICINE
Payer: MEDICARE

## 2021-06-09 DIAGNOSIS — K74.60: ICD-10-CM

## 2021-06-09 DIAGNOSIS — R73.03: ICD-10-CM

## 2021-06-09 DIAGNOSIS — Z12.5: ICD-10-CM

## 2021-06-09 DIAGNOSIS — I25.10: Primary | ICD-10-CM

## 2021-06-09 DIAGNOSIS — E78.2: ICD-10-CM

## 2021-06-09 LAB
ALBUMIN SERPL BCG-MCNC: 3.7 GM/DL (ref 3.2–5.2)
ALT SERPL W P-5'-P-CCNC: 32 U/L (ref 12–78)
BILIRUB SERPL-MCNC: 1.1 MG/DL (ref 0.2–1)
BUN SERPL-MCNC: 19 MG/DL (ref 7–18)
CALCIUM SERPL-MCNC: 9.2 MG/DL (ref 8.8–10.2)
CHLORIDE SERPL-SCNC: 108 MEQ/L (ref 98–107)
CHOLEST SERPL-MCNC: 127 MG/DL (ref ?–200)
CHOLEST/HDLC SERPL: 2.27 {RATIO} (ref ?–5)
CO2 SERPL-SCNC: 26 MEQ/L (ref 21–32)
CREAT SERPL-MCNC: 1 MG/DL (ref 0.7–1.3)
EST. AVERAGE GLUCOSE BLD GHB EST-MCNC: 108 MG/DL (ref 60–110)
GFR SERPL CREATININE-BSD FRML MDRD: > 60 ML/MIN/{1.73_M2} (ref 42–?)
GLUCOSE SERPL-MCNC: 106 MG/DL (ref 70–100)
HCT VFR BLD AUTO: 46.7 % (ref 42–52)
HDLC SERPL-MCNC: 56 MG/DL (ref 40–?)
HGB BLD-MCNC: 15.7 G/DL (ref 13.5–17.5)
LDLC SERPL CALC-MCNC: 55 MG/DL (ref ?–100)
MCH RBC QN AUTO: 33.9 PG (ref 27–33)
MCHC RBC AUTO-ENTMCNC: 33.6 G/DL (ref 32–36.5)
MCV RBC AUTO: 100.9 FL (ref 80–96)
NONHDLC SERPL-MCNC: 71 MG/DL
PLATELET # BLD AUTO: 129 10^3/UL (ref 150–450)
POTASSIUM SERPL-SCNC: 4 MEQ/L (ref 3.5–5.1)
PROT SERPL-MCNC: 6.8 GM/DL (ref 6.4–8.2)
RBC # BLD AUTO: 4.63 10^6/UL (ref 4.3–6.1)
SODIUM SERPL-SCNC: 142 MEQ/L (ref 136–145)
TRIGL SERPL-MCNC: 81 MG/DL (ref ?–150)
WBC # BLD AUTO: 3.6 10^3/UL (ref 4–10)

## 2021-06-09 PROCEDURE — 83520 IMMUNOASSAY QUANT NOS NONAB: CPT

## 2021-06-09 PROCEDURE — 85027 COMPLETE CBC AUTOMATED: CPT

## 2021-06-09 PROCEDURE — 83036 HEMOGLOBIN GLYCOSYLATED A1C: CPT

## 2021-06-09 PROCEDURE — 80053 COMPREHEN METABOLIC PANEL: CPT

## 2021-06-09 PROCEDURE — 36415 COLL VENOUS BLD VENIPUNCTURE: CPT

## 2021-06-09 PROCEDURE — 80061 LIPID PANEL: CPT

## 2021-06-09 PROCEDURE — 82397 CHEMILUMINESCENT ASSAY: CPT

## 2021-06-09 PROCEDURE — 83883 ASSAY NEPHELOMETRY NOT SPEC: CPT

## 2021-08-02 ENCOUNTER — HOSPITAL ENCOUNTER (OUTPATIENT)
Dept: HOSPITAL 53 - M LABSMTC | Age: 76
End: 2021-08-02
Attending: ANESTHESIOLOGY
Payer: MEDICARE

## 2021-08-02 DIAGNOSIS — Z01.812: Primary | ICD-10-CM

## 2021-08-02 DIAGNOSIS — Z20.828: ICD-10-CM

## 2021-08-06 ENCOUNTER — HOSPITAL ENCOUNTER (OUTPATIENT)
Dept: HOSPITAL 53 - M OPP | Age: 76
Discharge: HOME | End: 2021-08-06
Attending: INTERNAL MEDICINE
Payer: MEDICARE

## 2021-08-06 VITALS — DIASTOLIC BLOOD PRESSURE: 74 MMHG | SYSTOLIC BLOOD PRESSURE: 133 MMHG

## 2021-08-06 VITALS — BODY MASS INDEX: 28.14 KG/M2 | HEIGHT: 69 IN | WEIGHT: 190 LBS

## 2021-08-06 DIAGNOSIS — K57.30: ICD-10-CM

## 2021-08-06 DIAGNOSIS — Z79.82: ICD-10-CM

## 2021-08-06 DIAGNOSIS — K64.8: ICD-10-CM

## 2021-08-06 DIAGNOSIS — Z12.11: Primary | ICD-10-CM

## 2021-08-06 DIAGNOSIS — D12.3: ICD-10-CM

## 2021-08-06 DIAGNOSIS — Z86.010: ICD-10-CM

## 2021-08-06 DIAGNOSIS — Z88.8: ICD-10-CM

## 2021-08-06 DIAGNOSIS — Z88.0: ICD-10-CM

## 2021-08-06 DIAGNOSIS — Z79.899: ICD-10-CM

## 2022-02-24 ENCOUNTER — HOSPITAL ENCOUNTER (OUTPATIENT)
Dept: HOSPITAL 53 - M SFHCADAM | Age: 77
End: 2022-02-24
Attending: PHYSICIAN ASSISTANT
Payer: MEDICARE

## 2022-02-24 DIAGNOSIS — R73.03: ICD-10-CM

## 2022-02-24 DIAGNOSIS — Z98.61: ICD-10-CM

## 2022-02-24 DIAGNOSIS — I25.10: Primary | ICD-10-CM

## 2022-02-24 DIAGNOSIS — Z12.5: ICD-10-CM

## 2022-02-24 DIAGNOSIS — K74.60: ICD-10-CM

## 2022-02-24 LAB
ALBUMIN SERPL BCG-MCNC: 3.9 GM/DL (ref 3.2–5.2)
ALT SERPL W P-5'-P-CCNC: 43 U/L (ref 12–78)
BILIRUB SERPL-MCNC: 0.8 MG/DL (ref 0.2–1)
BUN SERPL-MCNC: 12 MG/DL (ref 7–18)
CALCIUM SERPL-MCNC: 9.2 MG/DL (ref 8.8–10.2)
CHLORIDE SERPL-SCNC: 109 MEQ/L (ref 98–107)
CHOLEST SERPL-MCNC: 130 MG/DL (ref ?–200)
CHOLEST/HDLC SERPL: 2.24 {RATIO} (ref ?–5)
CO2 SERPL-SCNC: 25 MEQ/L (ref 21–32)
CREAT SERPL-MCNC: 1.01 MG/DL (ref 0.7–1.3)
EST. AVERAGE GLUCOSE BLD GHB EST-MCNC: 111 MG/DL (ref 60–110)
GFR SERPL CREATININE-BSD FRML MDRD: > 60 ML/MIN/{1.73_M2} (ref 42–?)
GLUCOSE SERPL-MCNC: 85 MG/DL (ref 70–100)
HCT VFR BLD AUTO: 49 % (ref 42–52)
HDLC SERPL-MCNC: 58 MG/DL (ref 40–?)
HGB BLD-MCNC: 16.5 G/DL (ref 13.5–17.5)
INR PPP: 0.94
LDLC SERPL CALC-MCNC: 44 MG/DL (ref ?–100)
MCH RBC QN AUTO: 33.7 PG (ref 27–33)
MCHC RBC AUTO-ENTMCNC: 33.7 G/DL (ref 32–36.5)
MCV RBC AUTO: 100.2 FL (ref 80–96)
NONHDLC SERPL-MCNC: 72 MG/DL
PLATELET # BLD AUTO: 136 10^3/UL (ref 150–450)
POTASSIUM SERPL-SCNC: 3.8 MEQ/L (ref 3.5–5.1)
PROT SERPL-MCNC: 6.9 GM/DL (ref 6.4–8.2)
PROTHROMBIN TIME: 13 SECONDS (ref 12.7–14.5)
RBC # BLD AUTO: 4.89 10^6/UL (ref 4.3–6.1)
SODIUM SERPL-SCNC: 142 MEQ/L (ref 136–145)
TRIGL SERPL-MCNC: 140 MG/DL (ref ?–150)
WBC # BLD AUTO: 6.4 10^3/UL (ref 4–10)

## 2022-04-01 ENCOUNTER — HOSPITAL ENCOUNTER (OUTPATIENT)
Dept: HOSPITAL 53 - M PLAIMG | Age: 77
End: 2022-04-01
Attending: FAMILY MEDICINE
Payer: MEDICARE

## 2022-04-01 DIAGNOSIS — Z86.73: ICD-10-CM

## 2022-04-01 DIAGNOSIS — I63.81: ICD-10-CM

## 2022-04-01 DIAGNOSIS — H81.399: Primary | ICD-10-CM

## 2022-04-02 ENCOUNTER — HOSPITAL ENCOUNTER (INPATIENT)
Dept: HOSPITAL 53 - M ED | Age: 77
LOS: 2 days | Discharge: HOME HEALTH | DRG: 66 | End: 2022-04-04
Attending: GENERAL PRACTICE | Admitting: GENERAL PRACTICE
Payer: MEDICARE

## 2022-04-02 VITALS — DIASTOLIC BLOOD PRESSURE: 81 MMHG | SYSTOLIC BLOOD PRESSURE: 174 MMHG

## 2022-04-02 VITALS — SYSTOLIC BLOOD PRESSURE: 168 MMHG | DIASTOLIC BLOOD PRESSURE: 81 MMHG

## 2022-04-02 VITALS — SYSTOLIC BLOOD PRESSURE: 163 MMHG | DIASTOLIC BLOOD PRESSURE: 82 MMHG

## 2022-04-02 VITALS — WEIGHT: 197.53 LBS | BODY MASS INDEX: 31 KG/M2 | HEIGHT: 67 IN

## 2022-04-02 VITALS — SYSTOLIC BLOOD PRESSURE: 160 MMHG | DIASTOLIC BLOOD PRESSURE: 78 MMHG

## 2022-04-02 DIAGNOSIS — Z79.02: ICD-10-CM

## 2022-04-02 DIAGNOSIS — Z87.891: ICD-10-CM

## 2022-04-02 DIAGNOSIS — I63.89: Primary | ICD-10-CM

## 2022-04-02 DIAGNOSIS — K64.8: ICD-10-CM

## 2022-04-02 DIAGNOSIS — Z87.442: ICD-10-CM

## 2022-04-02 DIAGNOSIS — K57.90: ICD-10-CM

## 2022-04-02 DIAGNOSIS — Z79.82: ICD-10-CM

## 2022-04-02 DIAGNOSIS — I25.2: ICD-10-CM

## 2022-04-02 DIAGNOSIS — I10: ICD-10-CM

## 2022-04-02 DIAGNOSIS — R20.0: ICD-10-CM

## 2022-04-02 DIAGNOSIS — Z95.5: ICD-10-CM

## 2022-04-02 DIAGNOSIS — I87.2: ICD-10-CM

## 2022-04-02 DIAGNOSIS — Z79.899: ICD-10-CM

## 2022-04-02 DIAGNOSIS — M81.0: ICD-10-CM

## 2022-04-02 DIAGNOSIS — Z86.010: ICD-10-CM

## 2022-04-02 DIAGNOSIS — E78.5: ICD-10-CM

## 2022-04-02 DIAGNOSIS — Z88.8: ICD-10-CM

## 2022-04-02 DIAGNOSIS — I25.10: ICD-10-CM

## 2022-04-02 DIAGNOSIS — K43.9: ICD-10-CM

## 2022-04-02 DIAGNOSIS — I65.23: ICD-10-CM

## 2022-04-02 DIAGNOSIS — E66.9: ICD-10-CM

## 2022-04-02 DIAGNOSIS — Z88.0: ICD-10-CM

## 2022-04-02 LAB
ALBUMIN SERPL BCG-MCNC: 3.8 GM/DL (ref 3.2–5.2)
ALT SERPL W P-5'-P-CCNC: 37 U/L (ref 12–78)
BASOPHILS # BLD AUTO: 0 10^3/UL (ref 0–0.2)
BASOPHILS NFR BLD AUTO: 0.9 % (ref 0–1)
BILIRUB CONJ SERPL-MCNC: 0.3 MG/DL (ref 0–0.2)
BILIRUB SERPL-MCNC: 1.2 MG/DL (ref 0.2–1)
BUN SERPL-MCNC: 15 MG/DL (ref 7–18)
CALCIUM SERPL-MCNC: 8.8 MG/DL (ref 8.8–10.2)
CHLORIDE SERPL-SCNC: 110 MEQ/L (ref 98–107)
CO2 SERPL-SCNC: 29 MEQ/L (ref 21–32)
CREAT SERPL-MCNC: 1.1 MG/DL (ref 0.7–1.3)
EOSINOPHIL # BLD AUTO: 0.2 10^3/UL (ref 0–0.5)
EOSINOPHIL NFR BLD AUTO: 5.4 % (ref 0–3)
GFR SERPL CREATININE-BSD FRML MDRD: > 60 ML/MIN/{1.73_M2} (ref 42–?)
GLUCOSE SERPL-MCNC: 102 MG/DL (ref 70–100)
HCT VFR BLD AUTO: 49 % (ref 42–52)
HGB BLD-MCNC: 16.6 G/DL (ref 13.5–17.5)
LYMPHOCYTES # BLD AUTO: 0.8 10^3/UL (ref 1.5–5)
LYMPHOCYTES NFR BLD AUTO: 18.7 % (ref 24–44)
MCH RBC QN AUTO: 34.4 PG (ref 27–33)
MCHC RBC AUTO-ENTMCNC: 33.9 G/DL (ref 32–36.5)
MCV RBC AUTO: 101.7 FL (ref 80–96)
MONOCYTES # BLD AUTO: 0.5 10^3/UL (ref 0–0.8)
MONOCYTES NFR BLD AUTO: 10.9 % (ref 2–8)
NEUTROPHILS # BLD AUTO: 2.7 10^3/UL (ref 1.5–8.5)
NEUTROPHILS NFR BLD AUTO: 63.9 % (ref 36–66)
PLATELET # BLD AUTO: 132 10^3/UL (ref 150–450)
POTASSIUM SERPL-SCNC: 3.4 MEQ/L (ref 3.5–5.1)
PROT SERPL-MCNC: 7.3 GM/DL (ref 6.4–8.2)
RBC # BLD AUTO: 4.82 10^6/UL (ref 4.3–6.1)
RSV RNA NPH QL NAA+PROBE: NEGATIVE
SODIUM SERPL-SCNC: 143 MEQ/L (ref 136–145)
WBC # BLD AUTO: 4.2 10^3/UL (ref 4–10)

## 2022-04-02 RX ADMIN — PANTOPRAZOLE SODIUM SCH MG: 40 TABLET, DELAYED RELEASE ORAL at 13:41

## 2022-04-02 RX ADMIN — POTASSIUM CHLORIDE ONE MEQ: 750 TABLET, EXTENDED RELEASE ORAL at 16:18

## 2022-04-02 RX ADMIN — ACETAMINOPHEN PRN MG: 500 TABLET ORAL at 16:20

## 2022-04-02 RX ADMIN — POTASSIUM CHLORIDE ONE MEQ: 750 TABLET, EXTENDED RELEASE ORAL at 16:12

## 2022-04-02 RX ADMIN — Medication SCH UNITS: at 13:40

## 2022-04-03 VITALS — DIASTOLIC BLOOD PRESSURE: 72 MMHG | SYSTOLIC BLOOD PRESSURE: 150 MMHG

## 2022-04-03 VITALS — SYSTOLIC BLOOD PRESSURE: 137 MMHG | DIASTOLIC BLOOD PRESSURE: 65 MMHG

## 2022-04-03 VITALS — DIASTOLIC BLOOD PRESSURE: 74 MMHG | SYSTOLIC BLOOD PRESSURE: 157 MMHG

## 2022-04-03 VITALS — DIASTOLIC BLOOD PRESSURE: 62 MMHG | SYSTOLIC BLOOD PRESSURE: 130 MMHG

## 2022-04-03 VITALS — DIASTOLIC BLOOD PRESSURE: 57 MMHG | SYSTOLIC BLOOD PRESSURE: 115 MMHG

## 2022-04-03 VITALS — DIASTOLIC BLOOD PRESSURE: 67 MMHG | SYSTOLIC BLOOD PRESSURE: 132 MMHG

## 2022-04-03 VITALS — DIASTOLIC BLOOD PRESSURE: 65 MMHG | SYSTOLIC BLOOD PRESSURE: 128 MMHG

## 2022-04-03 VITALS — SYSTOLIC BLOOD PRESSURE: 137 MMHG | DIASTOLIC BLOOD PRESSURE: 81 MMHG

## 2022-04-03 VITALS — SYSTOLIC BLOOD PRESSURE: 145 MMHG | DIASTOLIC BLOOD PRESSURE: 69 MMHG

## 2022-04-03 LAB
BUN SERPL-MCNC: 14 MG/DL (ref 7–18)
CALCIUM SERPL-MCNC: 8.6 MG/DL (ref 8.8–10.2)
CHLORIDE SERPL-SCNC: 113 MEQ/L (ref 98–107)
CHOLEST SERPL-MCNC: 107 MG/DL (ref ?–200)
CHOLEST/HDLC SERPL: 2.06 {RATIO} (ref ?–5)
CO2 SERPL-SCNC: 26 MEQ/L (ref 21–32)
CREAT SERPL-MCNC: 1.01 MG/DL (ref 0.7–1.3)
EST. AVERAGE GLUCOSE BLD GHB EST-MCNC: 108 MG/DL (ref 60–110)
GFR SERPL CREATININE-BSD FRML MDRD: > 60 ML/MIN/{1.73_M2} (ref 42–?)
GLUCOSE SERPL-MCNC: 92 MG/DL (ref 70–100)
HCT VFR BLD AUTO: 46.5 % (ref 42–52)
HDLC SERPL-MCNC: 52 MG/DL (ref 40–?)
HGB BLD-MCNC: 15.8 G/DL (ref 13.5–17.5)
LDLC SERPL CALC-MCNC: 38 MG/DL (ref ?–100)
MCH RBC QN AUTO: 33.8 PG (ref 27–33)
MCHC RBC AUTO-ENTMCNC: 34 G/DL (ref 32–36.5)
MCV RBC AUTO: 99.4 FL (ref 80–96)
NONHDLC SERPL-MCNC: 55 MG/DL
PLATELET # BLD AUTO: 121 10^3/UL (ref 150–450)
POTASSIUM SERPL-SCNC: 3.8 MEQ/L (ref 3.5–5.1)
RBC # BLD AUTO: 4.68 10^6/UL (ref 4.3–6.1)
SODIUM SERPL-SCNC: 145 MEQ/L (ref 136–145)
TRIGL SERPL-MCNC: 84 MG/DL (ref ?–150)
TSH SERPL DL<=0.005 MIU/L-ACNC: 2.12 UIU/ML (ref 0.36–3.74)
WBC # BLD AUTO: 4.6 10^3/UL (ref 4–10)

## 2022-04-03 RX ADMIN — ACETAMINOPHEN SCH MG: 500 TABLET ORAL at 16:03

## 2022-04-03 RX ADMIN — Medication SCH UNITS: at 09:08

## 2022-04-03 RX ADMIN — POTASSIUM CHLORIDE SCH MEQ: 750 TABLET, EXTENDED RELEASE ORAL at 09:08

## 2022-04-03 RX ADMIN — CLOPIDOGREL BISULFATE SCH MG: 75 TABLET ORAL at 09:07

## 2022-04-03 RX ADMIN — ACETAMINOPHEN SCH MG: 500 TABLET ORAL at 21:09

## 2022-04-03 RX ADMIN — PANTOPRAZOLE SODIUM SCH MG: 40 TABLET, DELAYED RELEASE ORAL at 09:08

## 2022-04-03 RX ADMIN — ACETAMINOPHEN PRN MG: 500 TABLET ORAL at 00:38

## 2022-04-03 RX ADMIN — ACETAMINOPHEN SCH MG: 500 TABLET ORAL at 10:10

## 2022-04-03 RX ADMIN — ROSUVASTATIN CALCIUM SCH MG: 10 TABLET, FILM COATED ORAL at 09:07

## 2022-04-04 VITALS — SYSTOLIC BLOOD PRESSURE: 120 MMHG | DIASTOLIC BLOOD PRESSURE: 56 MMHG

## 2022-04-04 VITALS — DIASTOLIC BLOOD PRESSURE: 61 MMHG | SYSTOLIC BLOOD PRESSURE: 118 MMHG

## 2022-04-04 VITALS — DIASTOLIC BLOOD PRESSURE: 55 MMHG | SYSTOLIC BLOOD PRESSURE: 110 MMHG

## 2022-04-04 VITALS — SYSTOLIC BLOOD PRESSURE: 118 MMHG | DIASTOLIC BLOOD PRESSURE: 61 MMHG

## 2022-04-04 RX ADMIN — PANTOPRAZOLE SODIUM SCH MG: 40 TABLET, DELAYED RELEASE ORAL at 09:45

## 2022-04-04 RX ADMIN — POTASSIUM CHLORIDE SCH MEQ: 750 TABLET, EXTENDED RELEASE ORAL at 09:00

## 2022-04-04 RX ADMIN — ACETAMINOPHEN SCH MG: 500 TABLET ORAL at 04:09

## 2022-04-04 RX ADMIN — Medication SCH UNITS: at 09:45

## 2022-04-04 RX ADMIN — ROSUVASTATIN CALCIUM SCH MG: 10 TABLET, FILM COATED ORAL at 09:47

## 2022-04-04 RX ADMIN — CLOPIDOGREL BISULFATE SCH MG: 75 TABLET ORAL at 09:45

## 2022-04-04 RX ADMIN — ACETAMINOPHEN SCH MG: 500 TABLET ORAL at 09:47

## 2022-04-05 LAB — HSV IGM TYPES 1&2: <0.91 RATIO (ref 0–0.9)

## 2022-04-22 ENCOUNTER — HOSPITAL ENCOUNTER (OUTPATIENT)
Dept: HOSPITAL 53 - M LABSMTC | Age: 77
End: 2022-04-22
Attending: ANESTHESIOLOGY
Payer: MEDICARE

## 2022-04-22 DIAGNOSIS — Z01.812: Primary | ICD-10-CM

## 2022-04-22 DIAGNOSIS — Z20.822: ICD-10-CM

## 2022-04-26 ENCOUNTER — HOSPITAL ENCOUNTER (OUTPATIENT)
Dept: HOSPITAL 53 - M SDC | Age: 77
Discharge: HOME | End: 2022-04-26
Attending: INTERNAL MEDICINE
Payer: MEDICARE

## 2022-04-26 VITALS — BODY MASS INDEX: 30.29 KG/M2 | HEIGHT: 67 IN | WEIGHT: 193 LBS

## 2022-04-26 VITALS — SYSTOLIC BLOOD PRESSURE: 152 MMHG | DIASTOLIC BLOOD PRESSURE: 66 MMHG

## 2022-04-26 DIAGNOSIS — I65.23: ICD-10-CM

## 2022-04-26 DIAGNOSIS — I10: ICD-10-CM

## 2022-04-26 DIAGNOSIS — Z95.5: ICD-10-CM

## 2022-04-26 DIAGNOSIS — I25.2: ICD-10-CM

## 2022-04-26 DIAGNOSIS — R94.31: ICD-10-CM

## 2022-04-26 DIAGNOSIS — I25.10: ICD-10-CM

## 2022-04-26 DIAGNOSIS — M81.0: ICD-10-CM

## 2022-04-26 DIAGNOSIS — Z79.899: ICD-10-CM

## 2022-04-26 DIAGNOSIS — I49.3: ICD-10-CM

## 2022-04-26 DIAGNOSIS — R82.994: ICD-10-CM

## 2022-04-26 DIAGNOSIS — I63.9: Primary | ICD-10-CM

## 2022-04-26 DIAGNOSIS — E78.00: ICD-10-CM

## 2022-04-26 DIAGNOSIS — Z88.0: ICD-10-CM

## 2022-04-26 DIAGNOSIS — Z88.8: ICD-10-CM

## 2022-04-26 DIAGNOSIS — Z87.891: ICD-10-CM

## 2022-04-26 DIAGNOSIS — Z79.02: ICD-10-CM

## 2022-04-26 DIAGNOSIS — Z86.73: ICD-10-CM

## 2022-04-26 DIAGNOSIS — E66.3: ICD-10-CM

## 2022-04-26 PROCEDURE — 33285 INSJ SUBQ CAR RHYTHM MNTR: CPT

## 2022-05-19 ENCOUNTER — HOSPITAL ENCOUNTER (OUTPATIENT)
Dept: HOSPITAL 53 - M LABSMTC | Age: 77
End: 2022-05-19
Attending: ANESTHESIOLOGY
Payer: MEDICARE

## 2022-05-19 DIAGNOSIS — Z01.812: Primary | ICD-10-CM

## 2022-05-19 DIAGNOSIS — Z20.822: ICD-10-CM

## 2022-05-20 ENCOUNTER — HOSPITAL ENCOUNTER (OUTPATIENT)
Dept: HOSPITAL 53 - M PLALAB | Age: 77
End: 2022-05-20
Attending: FAMILY MEDICINE
Payer: MEDICARE

## 2022-05-20 DIAGNOSIS — K74.60: ICD-10-CM

## 2022-05-20 DIAGNOSIS — R73.03: ICD-10-CM

## 2022-05-20 DIAGNOSIS — Z12.5: ICD-10-CM

## 2022-05-20 DIAGNOSIS — Z98.61: ICD-10-CM

## 2022-05-20 DIAGNOSIS — I25.10: Primary | ICD-10-CM

## 2022-05-20 LAB
ALBUMIN SERPL BCG-MCNC: 3.8 GM/DL (ref 3.2–5.2)
ALT SERPL W P-5'-P-CCNC: 34 U/L (ref 12–78)
BILIRUB SERPL-MCNC: 1 MG/DL (ref 0.2–1)
BUN SERPL-MCNC: 15 MG/DL (ref 7–18)
CALCIUM SERPL-MCNC: 8.9 MG/DL (ref 8.8–10.2)
CHLORIDE SERPL-SCNC: 108 MEQ/L (ref 98–107)
CHOLEST SERPL-MCNC: 117 MG/DL (ref ?–200)
CHOLEST/HDLC SERPL: 2.02 {RATIO} (ref ?–5)
CO2 SERPL-SCNC: 28 MEQ/L (ref 21–32)
CREAT SERPL-MCNC: 1.2 MG/DL (ref 0.7–1.3)
EST. AVERAGE GLUCOSE BLD GHB EST-MCNC: 114 MG/DL (ref 60–110)
GFR SERPL CREATININE-BSD FRML MDRD: > 60 ML/MIN/{1.73_M2} (ref 42–?)
GLUCOSE SERPL-MCNC: 106 MG/DL (ref 70–100)
HCT VFR BLD AUTO: 46.9 % (ref 42–52)
HDLC SERPL-MCNC: 58 MG/DL (ref 40–?)
HGB BLD-MCNC: 16.3 G/DL (ref 13.5–17.5)
INR PPP: 0.97
LDLC SERPL CALC-MCNC: 44 MG/DL (ref ?–100)
MCH RBC QN AUTO: 35.4 PG (ref 27–33)
MCHC RBC AUTO-ENTMCNC: 34.8 G/DL (ref 32–36.5)
MCV RBC AUTO: 101.7 FL (ref 80–96)
NONHDLC SERPL-MCNC: 59 MG/DL
PLATELET # BLD AUTO: 143 10^3/UL (ref 150–450)
POTASSIUM SERPL-SCNC: 4.5 MEQ/L (ref 3.5–5.1)
PROT SERPL-MCNC: 6.9 GM/DL (ref 6.4–8.2)
PROTHROMBIN TIME: 13.3 SECONDS (ref 12.7–14.5)
RBC # BLD AUTO: 4.61 10^6/UL (ref 4.3–6.1)
SODIUM SERPL-SCNC: 140 MEQ/L (ref 136–145)
TRIGL SERPL-MCNC: 73 MG/DL (ref ?–150)
WBC # BLD AUTO: 4.6 10^3/UL (ref 4–10)

## 2022-05-20 PROCEDURE — 36415 COLL VENOUS BLD VENIPUNCTURE: CPT

## 2022-05-20 PROCEDURE — 85610 PROTHROMBIN TIME: CPT

## 2022-05-20 PROCEDURE — 80053 COMPREHEN METABOLIC PANEL: CPT

## 2022-05-20 PROCEDURE — 85027 COMPLETE CBC AUTOMATED: CPT

## 2022-05-20 PROCEDURE — 83036 HEMOGLOBIN GLYCOSYLATED A1C: CPT

## 2022-05-20 PROCEDURE — 80061 LIPID PANEL: CPT

## 2022-05-24 ENCOUNTER — HOSPITAL ENCOUNTER (OUTPATIENT)
Dept: HOSPITAL 53 - M OPP | Age: 77
Discharge: HOME | End: 2022-05-24
Attending: INTERNAL MEDICINE
Payer: MEDICARE

## 2022-05-24 VITALS — BODY MASS INDEX: 30.45 KG/M2 | WEIGHT: 194 LBS | HEIGHT: 67 IN

## 2022-05-24 VITALS — SYSTOLIC BLOOD PRESSURE: 130 MMHG | DIASTOLIC BLOOD PRESSURE: 64 MMHG

## 2022-05-24 DIAGNOSIS — I49.3: ICD-10-CM

## 2022-05-24 DIAGNOSIS — I63.9: Primary | ICD-10-CM

## 2022-05-24 DIAGNOSIS — Z95.5: ICD-10-CM

## 2022-05-24 DIAGNOSIS — I25.2: ICD-10-CM

## 2022-05-24 DIAGNOSIS — I25.10: ICD-10-CM

## 2022-05-24 DIAGNOSIS — I70.0: ICD-10-CM

## 2022-05-24 PROCEDURE — 93312 ECHO TRANSESOPHAGEAL: CPT

## 2022-05-24 PROCEDURE — 93325 DOPPLER ECHO COLOR FLOW MAPG: CPT

## 2022-05-24 PROCEDURE — 93320 DOPPLER ECHO COMPLETE: CPT

## 2022-05-24 RX ADMIN — SODIUM CHLORIDE SCH MLS/HR: 9 INJECTION, SOLUTION INTRAVENOUS at 15:13

## 2022-09-20 ENCOUNTER — HOSPITAL ENCOUNTER (OUTPATIENT)
Dept: HOSPITAL 53 - M SFHCADAM | Age: 77
End: 2022-09-20
Attending: PHYSICIAN ASSISTANT
Payer: MEDICARE

## 2022-09-20 DIAGNOSIS — E78.2: ICD-10-CM

## 2022-09-20 DIAGNOSIS — I63.9: Primary | ICD-10-CM

## 2022-09-20 DIAGNOSIS — I25.10: ICD-10-CM

## 2022-09-20 DIAGNOSIS — K74.60: ICD-10-CM

## 2022-09-21 ENCOUNTER — HOSPITAL ENCOUNTER (OUTPATIENT)
Dept: HOSPITAL 53 - M RAD | Age: 77
End: 2022-09-21
Attending: PHYSICIAN ASSISTANT
Payer: MEDICARE

## 2022-09-21 DIAGNOSIS — I63.9: Primary | ICD-10-CM

## 2022-09-21 LAB
ALBUMIN SERPL BCG-MCNC: 4 GM/DL (ref 3.2–5.2)
ALT SERPL W P-5'-P-CCNC: 31 U/L (ref 12–78)
BILIRUB SERPL-MCNC: 1.1 MG/DL (ref 0.2–1)
BUN SERPL-MCNC: 18 MG/DL (ref 7–18)
CALCIUM SERPL-MCNC: 9.5 MG/DL (ref 8.8–10.2)
CHLORIDE SERPL-SCNC: 107 MEQ/L (ref 98–107)
CHOLEST SERPL-MCNC: 123 MG/DL (ref ?–200)
CHOLEST/HDLC SERPL: 2.12 {RATIO} (ref ?–5)
CO2 SERPL-SCNC: 24 MEQ/L (ref 21–32)
CREAT SERPL-MCNC: 1.26 MG/DL (ref 0.7–1.3)
GFR SERPL CREATININE-BSD FRML MDRD: 59.2 ML/MIN/{1.73_M2} (ref 42–?)
GLUCOSE SERPL-MCNC: 156 MG/DL (ref 70–100)
HCT VFR BLD AUTO: 48.3 % (ref 42–52)
HDLC SERPL-MCNC: 58 MG/DL (ref 40–?)
HGB BLD-MCNC: 16.2 G/DL (ref 13.5–17.5)
LDLC SERPL CALC-MCNC: 44 MG/DL (ref ?–100)
MCH RBC QN AUTO: 34.8 PG (ref 27–33)
MCHC RBC AUTO-ENTMCNC: 33.5 G/DL (ref 32–36.5)
MCV RBC AUTO: 103.6 FL (ref 80–96)
NONHDLC SERPL-MCNC: 65 MG/DL
PLATELET # BLD AUTO: 167 10^3/UL (ref 150–450)
POTASSIUM SERPL-SCNC: 3.8 MEQ/L (ref 3.5–5.1)
PROT SERPL-MCNC: 7.2 GM/DL (ref 6.4–8.2)
RBC # BLD AUTO: 4.66 10^6/UL (ref 4.3–6.1)
SODIUM SERPL-SCNC: 140 MEQ/L (ref 136–145)
T4 FREE SERPL-MCNC: 1.08 NG/DL (ref 0.76–1.46)
TRIGL SERPL-MCNC: 105 MG/DL (ref ?–150)
TSH SERPL DL<=0.005 MIU/L-ACNC: 1.35 UIU/ML (ref 0.36–3.74)
WBC # BLD AUTO: 5.8 10^3/UL (ref 4–10)

## 2022-09-21 PROCEDURE — 70553 MRI BRAIN STEM W/O & W/DYE: CPT

## 2022-10-20 ENCOUNTER — HOSPITAL ENCOUNTER (OUTPATIENT)
Dept: HOSPITAL 53 - M RAD | Age: 77
End: 2022-10-20
Attending: PHYSICIAN ASSISTANT
Payer: MEDICARE

## 2022-10-20 DIAGNOSIS — R13.10: Primary | ICD-10-CM

## 2023-03-22 ENCOUNTER — HOSPITAL ENCOUNTER (OUTPATIENT)
Dept: HOSPITAL 53 - M SFHCCLAY | Age: 78
End: 2023-03-22
Attending: FAMILY MEDICINE
Payer: MEDICARE

## 2023-03-22 DIAGNOSIS — E78.2: ICD-10-CM

## 2023-03-22 DIAGNOSIS — I63.9: ICD-10-CM

## 2023-03-22 DIAGNOSIS — Z12.5: ICD-10-CM

## 2023-03-22 DIAGNOSIS — R73.03: Primary | ICD-10-CM

## 2023-03-22 DIAGNOSIS — K74.60: ICD-10-CM

## 2023-03-22 LAB
ALBUMIN SERPL BCG-MCNC: 3.8 G/DL (ref 3.2–5.2)
ALP SERPL-CCNC: 95 U/L (ref 46–116)
ALT SERPL W P-5'-P-CCNC: 26 U/L (ref 7–40)
AST SERPL-CCNC: 25 U/L (ref ?–34)
BILIRUB SERPL-MCNC: 1.2 MG/DL (ref 0.3–1.2)
BUN SERPL-MCNC: 13 MG/DL (ref 9–23)
CALCIUM SERPL-MCNC: 8.9 MG/DL (ref 8.3–10.6)
CHLORIDE SERPL-SCNC: 106 MMOL/L (ref 98–107)
CHOLEST SERPL-MCNC: 129 MG/DL (ref ?–200)
CHOLEST/HDLC SERPL: 2.08 {RATIO} (ref ?–5)
CO2 SERPL-SCNC: 26 MMOL/L (ref 20–31)
CREAT SERPL-MCNC: 0.98 MG/DL (ref 0.7–1.3)
EST. AVERAGE GLUCOSE BLD GHB EST-MCNC: 108 MG/DL (ref 60–110)
GFR SERPL CREATININE-BSD FRML MDRD: > 60 ML/MIN/{1.73_M2} (ref 42–?)
GLUCOSE SERPL-MCNC: 100 MG/DL (ref 74–106)
HCT VFR BLD AUTO: 47.8 % (ref 42–52)
HDLC SERPL-MCNC: 61.8 MG/DL (ref 40–?)
HGB BLD-MCNC: 15.8 G/DL (ref 13.5–17.5)
INR PPP: 1
LDLC SERPL CALC-MCNC: 49.6 MG/DL (ref ?–100)
MCH RBC QN AUTO: 33.8 PG (ref 27–33)
MCHC RBC AUTO-ENTMCNC: 33.1 G/DL (ref 32–36.5)
MCV RBC AUTO: 102.1 FL (ref 80–96)
NONHDLC SERPL-MCNC: 67.2 MG/DL
PLATELET # BLD AUTO: 153 10^3/UL (ref 150–450)
POTASSIUM SERPL-SCNC: 3.8 MMOL/L (ref 3.5–5.1)
PROT SERPL-MCNC: 6.6 G/DL (ref 5.7–8.2)
PROTHROMBIN TIME: 13.4 SECONDS (ref 12.5–14.5)
RBC # BLD AUTO: 4.68 10^6/UL (ref 4.3–6.1)
SODIUM SERPL-SCNC: 142 MMOL/L (ref 136–145)
TRIGL SERPL-MCNC: 88 MG/DL (ref ?–150)
WBC # BLD AUTO: 4.4 10^3/UL (ref 4–10)

## 2023-03-22 PROCEDURE — 80061 LIPID PANEL: CPT

## 2023-03-22 PROCEDURE — 85027 COMPLETE CBC AUTOMATED: CPT

## 2023-03-22 PROCEDURE — 82105 ALPHA-FETOPROTEIN SERUM: CPT

## 2023-03-22 PROCEDURE — 80053 COMPREHEN METABOLIC PANEL: CPT

## 2023-03-22 PROCEDURE — 83036 HEMOGLOBIN GLYCOSYLATED A1C: CPT

## 2023-03-22 PROCEDURE — 85610 PROTHROMBIN TIME: CPT

## 2023-04-10 ENCOUNTER — HOSPITAL ENCOUNTER (OUTPATIENT)
Dept: HOSPITAL 53 - M PLALAB | Age: 78
End: 2023-04-10
Attending: INTERNAL MEDICINE
Payer: MEDICARE

## 2023-04-10 ENCOUNTER — HOSPITAL ENCOUNTER (OUTPATIENT)
Dept: HOSPITAL 53 - M WHC | Age: 78
End: 2023-04-10
Attending: INTERNAL MEDICINE
Payer: MEDICARE

## 2023-04-10 DIAGNOSIS — E55.9: ICD-10-CM

## 2023-04-10 DIAGNOSIS — M80.00XA: Primary | ICD-10-CM

## 2023-04-10 DIAGNOSIS — E55.9: Primary | ICD-10-CM

## 2023-04-10 DIAGNOSIS — M85.89: ICD-10-CM

## 2023-04-10 LAB
25(OH)D3 SERPL-MCNC: 45.5 NG/ML (ref 20–100)
CALCIUM SERPL-MCNC: 9.1 MG/DL (ref 8.3–10.6)

## 2023-06-14 ENCOUNTER — HOSPITAL ENCOUNTER (OUTPATIENT)
Dept: HOSPITAL 53 - M LABDRAWC | Age: 78
End: 2023-06-14
Attending: INTERNAL MEDICINE
Payer: MEDICARE

## 2023-06-14 DIAGNOSIS — M80.00XA: Primary | ICD-10-CM

## 2023-06-14 LAB
BUN SERPL-MCNC: 18 MG/DL (ref 9–23)
CALCIUM SERPL-MCNC: 9.5 MG/DL (ref 8.3–10.6)
CHLORIDE SERPL-SCNC: 108 MMOL/L (ref 98–107)
CO2 SERPL-SCNC: 26 MMOL/L (ref 20–31)
CREAT SERPL-MCNC: 0.99 MG/DL (ref 0.7–1.3)
GFR SERPL CREATININE-BSD FRML MDRD: > 60 ML/MIN/{1.73_M2} (ref 42–?)
GLUCOSE SERPL-MCNC: 111 MG/DL (ref 74–106)
POTASSIUM SERPL-SCNC: 3.9 MMOL/L (ref 3.5–5.1)
SODIUM SERPL-SCNC: 140 MMOL/L (ref 136–145)

## 2023-06-28 ENCOUNTER — HOSPITAL ENCOUNTER (OUTPATIENT)
Dept: HOSPITAL 53 - M INFU | Age: 78
End: 2023-06-28
Attending: INTERNAL MEDICINE
Payer: MEDICARE

## 2023-06-28 VITALS — DIASTOLIC BLOOD PRESSURE: 71 MMHG | SYSTOLIC BLOOD PRESSURE: 143 MMHG | OXYGEN SATURATION: 95 %

## 2023-06-28 VITALS — BODY MASS INDEX: 22.41 KG/M2 | WEIGHT: 156.53 LBS | HEIGHT: 70 IN

## 2023-06-28 VITALS — SYSTOLIC BLOOD PRESSURE: 164 MMHG | OXYGEN SATURATION: 96 % | DIASTOLIC BLOOD PRESSURE: 74 MMHG

## 2023-06-28 DIAGNOSIS — Z88.8: ICD-10-CM

## 2023-06-28 DIAGNOSIS — M80.00XD: Primary | ICD-10-CM

## 2023-06-28 DIAGNOSIS — Z88.0: ICD-10-CM

## 2023-06-28 PROCEDURE — 96413 CHEMO IV INFUSION 1 HR: CPT

## 2023-10-12 ENCOUNTER — HOSPITAL ENCOUNTER (INPATIENT)
Dept: HOSPITAL 53 - M ED | Age: 78
LOS: 3 days | Discharge: HOME | DRG: 392 | End: 2023-10-15
Attending: INTERNAL MEDICINE | Admitting: INTERNAL MEDICINE
Payer: MEDICARE

## 2023-10-12 VITALS — BODY MASS INDEX: 30.28 KG/M2 | HEIGHT: 66 IN | WEIGHT: 188.39 LBS

## 2023-10-12 DIAGNOSIS — Z79.899: ICD-10-CM

## 2023-10-12 DIAGNOSIS — Z88.0: ICD-10-CM

## 2023-10-12 DIAGNOSIS — I10: ICD-10-CM

## 2023-10-12 DIAGNOSIS — E78.5: ICD-10-CM

## 2023-10-12 DIAGNOSIS — I25.2: ICD-10-CM

## 2023-10-12 DIAGNOSIS — K57.30: ICD-10-CM

## 2023-10-12 DIAGNOSIS — K44.9: ICD-10-CM

## 2023-10-12 DIAGNOSIS — I25.10: ICD-10-CM

## 2023-10-12 DIAGNOSIS — A09: Primary | ICD-10-CM

## 2023-10-12 DIAGNOSIS — M81.0: ICD-10-CM

## 2023-10-12 DIAGNOSIS — Z95.5: ICD-10-CM

## 2023-10-12 DIAGNOSIS — K70.30: ICD-10-CM

## 2023-10-12 DIAGNOSIS — Z86.73: ICD-10-CM

## 2023-10-12 DIAGNOSIS — K80.10: ICD-10-CM

## 2023-10-12 DIAGNOSIS — Z88.8: ICD-10-CM

## 2023-10-12 DIAGNOSIS — Z79.82: ICD-10-CM

## 2023-10-12 LAB
ALBUMIN SERPL BCG-MCNC: 3.7 G/DL (ref 3.2–5.2)
ALP SERPL-CCNC: 81 U/L (ref 46–116)
ALT SERPL W P-5'-P-CCNC: 35 U/L (ref 7–40)
APTT BLD: 29.7 SECONDS (ref 24.8–34.2)
AST SERPL-CCNC: 27 U/L (ref ?–34)
BASOPHILS # BLD AUTO: 0 10^3/UL (ref 0–0.2)
BASOPHILS NFR BLD AUTO: 0.4 % (ref 0–1)
BILIRUB CONJ SERPL-MCNC: 0.6 MG/DL (ref ?–0.4)
BILIRUB SERPL-MCNC: 1.4 MG/DL (ref 0.3–1.2)
BUN SERPL-MCNC: 44 MG/DL (ref 9–23)
CALCIUM SERPL-MCNC: 8.9 MG/DL (ref 8.3–10.6)
CHLORIDE SERPL-SCNC: 110 MMOL/L (ref 98–107)
CK MB CFR.DF SERPL CALC: 3.15
CK MB CFR.DF SERPL CALC: 3.93
CK MB SERPL-MCNC: 2.4 NG/ML (ref ?–3.6)
CK MB SERPL-MCNC: 2.6 NG/ML (ref ?–3.6)
CK SERPL-CCNC: 66 U/L (ref 46–171)
CK SERPL-CCNC: 76 U/L (ref 46–171)
CO2 SERPL-SCNC: 23 MMOL/L (ref 20–31)
CREAT SERPL-MCNC: 0.86 MG/DL (ref 0.7–1.3)
EOSINOPHIL # BLD AUTO: 0 10^3/UL (ref 0–0.5)
EOSINOPHIL NFR BLD AUTO: 0.4 % (ref 0–3)
GFR SERPL CREATININE-BSD FRML MDRD: > 60 ML/MIN/{1.73_M2} (ref 42–?)
GLUCOSE SERPL-MCNC: 108 MG/DL (ref 74–106)
HCT VFR BLD AUTO: 40.7 % (ref 42–52)
HGB BLD-MCNC: 14 G/DL (ref 13.5–17.5)
INR PPP: 1.13
LIPASE SERPL-CCNC: 29 U/L (ref 12–53)
LYMPHOCYTES # BLD AUTO: 0.9 10^3/UL (ref 1.5–5)
LYMPHOCYTES NFR BLD AUTO: 13.1 % (ref 24–44)
MCH RBC QN AUTO: 34.8 PG (ref 27–33)
MCHC RBC AUTO-ENTMCNC: 34.4 G/DL (ref 32–36.5)
MCV RBC AUTO: 101.2 FL (ref 80–96)
MONOCYTES # BLD AUTO: 0.6 10^3/UL (ref 0–0.8)
MONOCYTES NFR BLD AUTO: 9.1 % (ref 2–8)
NEUTROPHILS # BLD AUTO: 5.2 10^3/UL (ref 1.5–8.5)
NEUTROPHILS NFR BLD AUTO: 76.9 % (ref 36–66)
PLATELET # BLD AUTO: 157 10^3/UL (ref 150–450)
POTASSIUM SERPL-SCNC: 4.2 MMOL/L (ref 3.5–5.1)
PROT SERPL-MCNC: 6.5 G/DL (ref 5.7–8.2)
PROTHROMBIN TIME: 14.2 SECONDS (ref 12.5–14.5)
RBC # BLD AUTO: 4.02 10^6/UL (ref 4.3–6.1)
SODIUM SERPL-SCNC: 143 MMOL/L (ref 136–145)
WBC # BLD AUTO: 6.8 10^3/UL (ref 4–10)

## 2023-10-13 VITALS — SYSTOLIC BLOOD PRESSURE: 143 MMHG | DIASTOLIC BLOOD PRESSURE: 63 MMHG | TEMPERATURE: 98 F | OXYGEN SATURATION: 97 %

## 2023-10-13 VITALS — DIASTOLIC BLOOD PRESSURE: 61 MMHG | OXYGEN SATURATION: 97 % | TEMPERATURE: 98.4 F | SYSTOLIC BLOOD PRESSURE: 121 MMHG

## 2023-10-13 VITALS — OXYGEN SATURATION: 95 % | SYSTOLIC BLOOD PRESSURE: 121 MMHG | TEMPERATURE: 97.6 F | DIASTOLIC BLOOD PRESSURE: 61 MMHG

## 2023-10-13 VITALS — OXYGEN SATURATION: 94 % | SYSTOLIC BLOOD PRESSURE: 135 MMHG | DIASTOLIC BLOOD PRESSURE: 63 MMHG | TEMPERATURE: 98.8 F

## 2023-10-13 LAB
ALBUMIN SERPL BCG-MCNC: 3.4 G/DL (ref 3.2–5.2)
ALP SERPL-CCNC: 69 U/L (ref 46–116)
ALT SERPL W P-5'-P-CCNC: 24 U/L (ref 7–40)
AST SERPL-CCNC: 21 U/L (ref ?–34)
BASOPHILS # BLD AUTO: 0 10^3/UL (ref 0–0.2)
BASOPHILS # BLD AUTO: 0.1 10^3/UL (ref 0–0.2)
BASOPHILS NFR BLD AUTO: 0.7 % (ref 0–1)
BASOPHILS NFR BLD AUTO: 0.8 % (ref 0–1)
BILIRUB SERPL-MCNC: 1.3 MG/DL (ref 0.3–1.2)
BUN SERPL-MCNC: 35 MG/DL (ref 9–23)
BUN SERPL-MCNC: 42 MG/DL (ref 9–23)
CALCIUM SERPL-MCNC: 8.3 MG/DL (ref 8.3–10.6)
CALCIUM SERPL-MCNC: 8.5 MG/DL (ref 8.3–10.6)
CHLORIDE SERPL-SCNC: 114 MMOL/L (ref 98–107)
CHLORIDE SERPL-SCNC: 114 MMOL/L (ref 98–107)
CO2 SERPL-SCNC: 20 MMOL/L (ref 20–31)
CO2 SERPL-SCNC: 22 MMOL/L (ref 20–31)
CREAT SERPL-MCNC: 0.86 MG/DL (ref 0.7–1.3)
CREAT SERPL-MCNC: 0.88 MG/DL (ref 0.7–1.3)
EOSINOPHIL # BLD AUTO: 0.1 10^3/UL (ref 0–0.5)
EOSINOPHIL # BLD AUTO: 0.1 10^3/UL (ref 0–0.5)
EOSINOPHIL NFR BLD AUTO: 1.9 % (ref 0–3)
EOSINOPHIL NFR BLD AUTO: 2.1 % (ref 0–3)
GFR SERPL CREATININE-BSD FRML MDRD: > 60 ML/MIN/{1.73_M2} (ref 42–?)
GFR SERPL CREATININE-BSD FRML MDRD: > 60 ML/MIN/{1.73_M2} (ref 42–?)
GLUCOSE SERPL-MCNC: 103 MG/DL (ref 74–106)
GLUCOSE SERPL-MCNC: 108 MG/DL (ref 74–106)
HCT VFR BLD AUTO: 33.6 % (ref 42–52)
HCT VFR BLD AUTO: 36.3 % (ref 42–52)
HCT VFR BLD AUTO: 37.5 % (ref 42–52)
HCT VFR BLD AUTO: 38.2 % (ref 42–52)
HGB BLD-MCNC: 11.4 G/DL (ref 13.5–17.5)
HGB BLD-MCNC: 12.4 G/DL (ref 13.5–17.5)
HGB BLD-MCNC: 12.8 G/DL (ref 13.5–17.5)
HGB BLD-MCNC: 13.2 G/DL (ref 13.5–17.5)
LYMPHOCYTES # BLD AUTO: 0.9 10^3/UL (ref 1.5–5)
LYMPHOCYTES # BLD AUTO: 1 10^3/UL (ref 1.5–5)
LYMPHOCYTES NFR BLD AUTO: 14.2 % (ref 24–44)
LYMPHOCYTES NFR BLD AUTO: 18 % (ref 24–44)
MCH RBC QN AUTO: 34.7 PG (ref 27–33)
MCH RBC QN AUTO: 35 PG (ref 27–33)
MCHC RBC AUTO-ENTMCNC: 34.1 G/DL (ref 32–36.5)
MCHC RBC AUTO-ENTMCNC: 34.2 G/DL (ref 32–36.5)
MCV RBC AUTO: 101.7 FL (ref 80–96)
MCV RBC AUTO: 102.5 FL (ref 80–96)
MONOCYTES # BLD AUTO: 0.5 10^3/UL (ref 0–0.8)
MONOCYTES # BLD AUTO: 0.7 10^3/UL (ref 0–0.8)
MONOCYTES NFR BLD AUTO: 10 % (ref 2–8)
MONOCYTES NFR BLD AUTO: 10.6 % (ref 2–8)
NEUTROPHILS # BLD AUTO: 3.7 10^3/UL (ref 1.5–8.5)
NEUTROPHILS # BLD AUTO: 4.5 10^3/UL (ref 1.5–8.5)
NEUTROPHILS NFR BLD AUTO: 69.2 % (ref 36–66)
NEUTROPHILS NFR BLD AUTO: 72 % (ref 36–66)
PLATELET # BLD AUTO: 144 10^3/UL (ref 150–450)
PLATELET # BLD AUTO: 144 10^3/UL (ref 150–450)
POTASSIUM SERPL-SCNC: 3.8 MMOL/L (ref 3.5–5.1)
POTASSIUM SERPL-SCNC: 4.1 MMOL/L (ref 3.5–5.1)
PROT SERPL-MCNC: 5.9 G/DL (ref 5.7–8.2)
RBC # BLD AUTO: 3.57 10^6/UL (ref 4.3–6.1)
RBC # BLD AUTO: 3.66 10^6/UL (ref 4.3–6.1)
SODIUM SERPL-SCNC: 145 MMOL/L (ref 136–145)
SODIUM SERPL-SCNC: 145 MMOL/L (ref 136–145)
WBC # BLD AUTO: 5.4 10^3/UL (ref 4–10)
WBC # BLD AUTO: 6.2 10^3/UL (ref 4–10)

## 2023-10-13 RX ADMIN — SUCRALFATE SCH GM: 1 TABLET ORAL at 20:05

## 2023-10-13 RX ADMIN — SUCRALFATE SCH GM: 1 TABLET ORAL at 09:51

## 2023-10-13 RX ADMIN — DEXTROSE MONOHYDRATE SCH MLS/HR: 50 INJECTION, SOLUTION INTRAVENOUS at 07:39

## 2023-10-13 RX ADMIN — DEXTROSE MONOHYDRATE SCH MG: 50 INJECTION, SOLUTION INTRAVENOUS at 20:04

## 2023-10-13 RX ADMIN — SUCRALFATE SCH GM: 1 TABLET ORAL at 12:37

## 2023-10-13 RX ADMIN — METRONIDAZOLE SCH MLS/HR: 500 INJECTION, SOLUTION INTRAVENOUS at 11:23

## 2023-10-13 RX ADMIN — CEFTRIAXONE SCH MLS/HR: 1 INJECTION, POWDER, FOR SOLUTION INTRAMUSCULAR; INTRAVENOUS at 09:51

## 2023-10-13 RX ADMIN — DEXTROSE MONOHYDRATE SCH MLS/HR: 50 INJECTION, SOLUTION INTRAVENOUS at 10:22

## 2023-10-13 RX ADMIN — METRONIDAZOLE SCH MLS/HR: 500 INJECTION, SOLUTION INTRAVENOUS at 18:35

## 2023-10-13 RX ADMIN — SUCRALFATE SCH GM: 1 TABLET ORAL at 17:30

## 2023-10-13 RX ADMIN — DEXTROSE MONOHYDRATE SCH MLS/HR: 50 INJECTION, SOLUTION INTRAVENOUS at 15:46

## 2023-10-13 RX ADMIN — SODIUM CHLORIDE, POTASSIUM CHLORIDE, SODIUM LACTATE AND CALCIUM CHLORIDE SCH MLS/HR: 600; 310; 30; 20 INJECTION, SOLUTION INTRAVENOUS at 16:45

## 2023-10-13 RX ADMIN — ROSUVASTATIN CALCIUM SCH MG: 10 TABLET, FILM COATED ORAL at 20:05

## 2023-10-13 RX ADMIN — ACETAMINOPHEN PRN MG: 325 TABLET ORAL at 20:05

## 2023-10-13 RX ADMIN — DEXTROSE MONOHYDRATE SCH MLS/HR: 50 INJECTION, SOLUTION INTRAVENOUS at 01:25

## 2023-10-14 VITALS — SYSTOLIC BLOOD PRESSURE: 127 MMHG | OXYGEN SATURATION: 94 % | TEMPERATURE: 97.8 F | DIASTOLIC BLOOD PRESSURE: 57 MMHG

## 2023-10-14 VITALS — TEMPERATURE: 97.5 F | DIASTOLIC BLOOD PRESSURE: 58 MMHG | SYSTOLIC BLOOD PRESSURE: 123 MMHG | OXYGEN SATURATION: 95 %

## 2023-10-14 VITALS — SYSTOLIC BLOOD PRESSURE: 150 MMHG | DIASTOLIC BLOOD PRESSURE: 63 MMHG | OXYGEN SATURATION: 99 % | TEMPERATURE: 98.1 F

## 2023-10-14 VITALS — TEMPERATURE: 97.2 F | SYSTOLIC BLOOD PRESSURE: 128 MMHG | DIASTOLIC BLOOD PRESSURE: 61 MMHG | OXYGEN SATURATION: 95 %

## 2023-10-14 VITALS — SYSTOLIC BLOOD PRESSURE: 125 MMHG | DIASTOLIC BLOOD PRESSURE: 56 MMHG | OXYGEN SATURATION: 95 % | TEMPERATURE: 99.2 F

## 2023-10-14 VITALS — TEMPERATURE: 98.6 F | DIASTOLIC BLOOD PRESSURE: 67 MMHG | SYSTOLIC BLOOD PRESSURE: 137 MMHG | OXYGEN SATURATION: 96 %

## 2023-10-14 LAB
BASOPHILS # BLD AUTO: 0 10^3/UL (ref 0–0.2)
BASOPHILS NFR BLD AUTO: 0.6 % (ref 0–1)
BUN SERPL-MCNC: 27 MG/DL (ref 9–23)
CALCIUM SERPL-MCNC: 7.8 MG/DL (ref 8.3–10.6)
CHLORIDE SERPL-SCNC: 113 MMOL/L (ref 98–107)
CO2 SERPL-SCNC: 24 MMOL/L (ref 20–31)
CREAT SERPL-MCNC: 0.95 MG/DL (ref 0.7–1.3)
EOSINOPHIL # BLD AUTO: 0.3 10^3/UL (ref 0–0.5)
EOSINOPHIL NFR BLD AUTO: 6.3 % (ref 0–3)
GFR SERPL CREATININE-BSD FRML MDRD: > 60 ML/MIN/{1.73_M2} (ref 42–?)
GLUCOSE SERPL-MCNC: 92 MG/DL (ref 74–106)
HCT VFR BLD AUTO: 31.3 % (ref 42–52)
HCT VFR BLD AUTO: 33.4 % (ref 42–52)
HCT VFR BLD AUTO: 33.9 % (ref 42–52)
HGB BLD-MCNC: 10.6 G/DL (ref 13.5–17.5)
HGB BLD-MCNC: 11.4 G/DL (ref 13.5–17.5)
HGB BLD-MCNC: 11.5 G/DL (ref 13.5–17.5)
LYMPHOCYTES # BLD AUTO: 1 10^3/UL (ref 1.5–5)
LYMPHOCYTES NFR BLD AUTO: 20.6 % (ref 24–44)
MCH RBC QN AUTO: 34.8 PG (ref 27–33)
MCHC RBC AUTO-ENTMCNC: 33.9 G/DL (ref 32–36.5)
MCV RBC AUTO: 102.6 FL (ref 80–96)
MONOCYTES # BLD AUTO: 0.5 10^3/UL (ref 0–0.8)
MONOCYTES NFR BLD AUTO: 9.9 % (ref 2–8)
NEUTROPHILS # BLD AUTO: 3.1 10^3/UL (ref 1.5–8.5)
NEUTROPHILS NFR BLD AUTO: 62.2 % (ref 36–66)
PLATELET # BLD AUTO: 112 10^3/UL (ref 150–450)
POTASSIUM SERPL-SCNC: 3.7 MMOL/L (ref 3.5–5.1)
RBC # BLD AUTO: 3.05 10^6/UL (ref 4.3–6.1)
SODIUM SERPL-SCNC: 145 MMOL/L (ref 136–145)
WBC # BLD AUTO: 5 10^3/UL (ref 4–10)

## 2023-10-14 RX ADMIN — SUCRALFATE SCH GM: 1 TABLET ORAL at 18:13

## 2023-10-14 RX ADMIN — ASPIRIN SCH MG: 81 TABLET ORAL at 10:06

## 2023-10-14 RX ADMIN — DEXTROSE MONOHYDRATE SCH MG: 50 INJECTION, SOLUTION INTRAVENOUS at 10:06

## 2023-10-14 RX ADMIN — CEFTRIAXONE SCH MLS/HR: 1 INJECTION, POWDER, FOR SOLUTION INTRAMUSCULAR; INTRAVENOUS at 10:06

## 2023-10-14 RX ADMIN — ROSUVASTATIN CALCIUM SCH MG: 10 TABLET, FILM COATED ORAL at 20:23

## 2023-10-14 RX ADMIN — SUCRALFATE SCH GM: 1 TABLET ORAL at 10:06

## 2023-10-14 RX ADMIN — SODIUM CHLORIDE, POTASSIUM CHLORIDE, SODIUM LACTATE AND CALCIUM CHLORIDE SCH MLS/HR: 600; 310; 30; 20 INJECTION, SOLUTION INTRAVENOUS at 01:10

## 2023-10-14 RX ADMIN — DEXTROSE MONOHYDRATE SCH MG: 50 INJECTION, SOLUTION INTRAVENOUS at 20:23

## 2023-10-14 RX ADMIN — ACETAMINOPHEN PRN MG: 325 TABLET ORAL at 19:26

## 2023-10-14 RX ADMIN — METRONIDAZOLE SCH MLS/HR: 500 INJECTION, SOLUTION INTRAVENOUS at 19:26

## 2023-10-14 RX ADMIN — METRONIDAZOLE SCH MLS/HR: 500 INJECTION, SOLUTION INTRAVENOUS at 03:09

## 2023-10-14 RX ADMIN — SUCRALFATE SCH GM: 1 TABLET ORAL at 20:23

## 2023-10-14 RX ADMIN — METRONIDAZOLE SCH MLS/HR: 500 INJECTION, SOLUTION INTRAVENOUS at 11:52

## 2023-10-14 RX ADMIN — SUCRALFATE SCH GM: 1 TABLET ORAL at 11:52

## 2023-10-14 RX ADMIN — ACETAMINOPHEN PRN MG: 325 TABLET ORAL at 10:06

## 2023-10-15 VITALS — TEMPERATURE: 98.7 F | DIASTOLIC BLOOD PRESSURE: 64 MMHG | OXYGEN SATURATION: 95 % | SYSTOLIC BLOOD PRESSURE: 137 MMHG

## 2023-10-15 LAB
BASOPHILS # BLD AUTO: 0 10^3/UL (ref 0–0.2)
BASOPHILS NFR BLD AUTO: 0.6 % (ref 0–1)
BUN SERPL-MCNC: 18 MG/DL (ref 9–23)
CALCIUM SERPL-MCNC: 7.8 MG/DL (ref 8.3–10.6)
CHLORIDE SERPL-SCNC: 112 MMOL/L (ref 98–107)
CO2 SERPL-SCNC: 22 MMOL/L (ref 20–31)
CREAT SERPL-MCNC: 0.83 MG/DL (ref 0.7–1.3)
EOSINOPHIL # BLD AUTO: 0.2 10^3/UL (ref 0–0.5)
EOSINOPHIL NFR BLD AUTO: 5 % (ref 0–3)
GFR SERPL CREATININE-BSD FRML MDRD: > 60 ML/MIN/{1.73_M2} (ref 42–?)
GLUCOSE SERPL-MCNC: 101 MG/DL (ref 74–106)
HCT VFR BLD AUTO: 31.4 % (ref 42–52)
HGB BLD-MCNC: 10.8 G/DL (ref 13.5–17.5)
LYMPHOCYTES # BLD AUTO: 1 10^3/UL (ref 1.5–5)
LYMPHOCYTES NFR BLD AUTO: 20.2 % (ref 24–44)
MCH RBC QN AUTO: 34.5 PG (ref 27–33)
MCHC RBC AUTO-ENTMCNC: 34.4 G/DL (ref 32–36.5)
MCV RBC AUTO: 100.3 FL (ref 80–96)
MONOCYTES # BLD AUTO: 0.6 10^3/UL (ref 0–0.8)
MONOCYTES NFR BLD AUTO: 12 % (ref 2–8)
NEUTROPHILS # BLD AUTO: 2.9 10^3/UL (ref 1.5–8.5)
NEUTROPHILS NFR BLD AUTO: 61.8 % (ref 36–66)
PLATELET # BLD AUTO: 106 10^3/UL (ref 150–450)
POTASSIUM SERPL-SCNC: 3.4 MMOL/L (ref 3.5–5.1)
RBC # BLD AUTO: 3.13 10^6/UL (ref 4.3–6.1)
SODIUM SERPL-SCNC: 143 MMOL/L (ref 136–145)
WBC # BLD AUTO: 4.8 10^3/UL (ref 4–10)

## 2023-10-15 RX ADMIN — SUCRALFATE SCH GM: 1 TABLET ORAL at 11:35

## 2023-10-15 RX ADMIN — DEXTROSE MONOHYDRATE SCH MG: 50 INJECTION, SOLUTION INTRAVENOUS at 08:42

## 2023-10-15 RX ADMIN — METRONIDAZOLE SCH MLS/HR: 500 INJECTION, SOLUTION INTRAVENOUS at 03:41

## 2023-10-15 RX ADMIN — CEFTRIAXONE SCH MLS/HR: 1 INJECTION, POWDER, FOR SOLUTION INTRAMUSCULAR; INTRAVENOUS at 11:35

## 2023-10-15 RX ADMIN — ASPIRIN SCH MG: 81 TABLET ORAL at 08:41

## 2023-10-15 RX ADMIN — SUCRALFATE SCH GM: 1 TABLET ORAL at 08:41

## 2023-10-15 RX ADMIN — ACETAMINOPHEN PRN MG: 325 TABLET ORAL at 08:42

## 2023-10-23 ENCOUNTER — HOSPITAL ENCOUNTER (OUTPATIENT)
Dept: HOSPITAL 53 - M SFHCCLAY | Age: 78
End: 2023-10-23
Attending: PHYSICIAN ASSISTANT
Payer: MEDICARE

## 2023-10-23 DIAGNOSIS — K92.2: Primary | ICD-10-CM

## 2023-10-23 LAB
BASOPHILS # BLD AUTO: 0 10^3/UL (ref 0–0.2)
BASOPHILS NFR BLD AUTO: 0.9 % (ref 0–1)
EOSINOPHIL # BLD AUTO: 0.2 10^3/UL (ref 0–0.5)
EOSINOPHIL NFR BLD AUTO: 5.6 % (ref 0–3)
HCT VFR BLD AUTO: 36.7 % (ref 42–52)
HGB BLD-MCNC: 12.7 G/DL (ref 13.5–17.5)
LYMPHOCYTES # BLD AUTO: 0.8 10^3/UL (ref 1.5–5)
LYMPHOCYTES NFR BLD AUTO: 18.3 % (ref 24–44)
MCH RBC QN AUTO: 34.9 PG (ref 27–33)
MCHC RBC AUTO-ENTMCNC: 34.6 G/DL (ref 32–36.5)
MCV RBC AUTO: 100.8 FL (ref 80–96)
MONOCYTES # BLD AUTO: 0.4 10^3/UL (ref 0–0.8)
MONOCYTES NFR BLD AUTO: 10.2 % (ref 2–8)
NEUTROPHILS # BLD AUTO: 2.8 10^3/UL (ref 1.5–8.5)
NEUTROPHILS NFR BLD AUTO: 64.8 % (ref 36–66)
PLATELET # BLD AUTO: 197 10^3/UL (ref 150–450)
RBC # BLD AUTO: 3.64 10^6/UL (ref 4.3–6.1)
WBC # BLD AUTO: 4.3 10^3/UL (ref 4–10)